# Patient Record
Sex: MALE | Race: WHITE | NOT HISPANIC OR LATINO | Employment: FULL TIME | URBAN - METROPOLITAN AREA
[De-identification: names, ages, dates, MRNs, and addresses within clinical notes are randomized per-mention and may not be internally consistent; named-entity substitution may affect disease eponyms.]

---

## 2017-11-01 NOTE — PATIENT INSTRUCTIONS
Refills of medications sent to [facility].    Lab work today.    I will call you later about the shingles vaccine.    Leslee Magaña, VITALY-C      Preventive Health Recommendations  Male Ages 50 - 64    Yearly exam:             See your health care provider every year in order to  o   Review health changes.   o   Discuss preventive care.    o   Review your medicines if your doctor has prescribed any.     Have a cholesterol test every 5 years, or more frequently if you are at risk for high cholesterol/heart disease.     Have a diabetes test (fasting glucose) every three years. If you are at risk for diabetes, you should have this test more often.     Have a colonoscopy at age 50, or have a yearly FIT test (stool test). These exams will check for colon cancer.      Talk with your health care provider about whether or not a prostate cancer screening test (PSA) is right for you.    You should be tested each year for STDs (sexually transmitted diseases), if you re at risk.     Shots: Get a flu shot each year. Get a tetanus shot every 10 years.     Nutrition:    Eat at least 5 servings of fruits and vegetables daily.     Eat whole-grain bread, whole-wheat pasta and brown rice instead of white grains and rice.     Talk to your provider about Calcium and Vitamin D.     Lifestyle    Exercise for at least 150 minutes a week (30 minutes a day, 5 days a week). This will help you control your weight and prevent disease.     Limit alcohol to one drink per day.     No smoking.     Wear sunscreen to prevent skin cancer.     See your dentist every six months for an exam and cleaning.     See your eye doctor every 1 to 2 years.

## 2017-11-01 NOTE — PROGRESS NOTES
SUBJECTIVE:   CC: Noris Small is an 52 year old male who presents for preventative health visit.     Pt. Need a refill on Atenolol and Hyoscyamine medication     Physical   Annual:     Getting at least 3 servings of Calcium per day::  NO    Bi-annual eye exam::  Yes    Dental care twice a year::  Yes    Sleep apnea or symptoms of sleep apnea::  Sleep apnea    Diet::  Regular (no restrictions) and Diabetic    Frequency of exercise::  None    Taking medications regularly::  Yes    Medication side effects::  None    Additional concerns today::  YES (he want to talk to you about vaccine singles and also he want you to look up his both ear he requested for ear lavage. he think he have a wax. )    No chicken pox  Colonoscopy - had yesterday - [place] [facility]    Polycystic kidney disease    Moderate alcohol      Patient just move here 2 month ago he is a new patient. All medical record is on [place].     Patient is just move here 2 months ago he is a new patient.         Today's PHQ-2 Score:   PHQ-2 ( 1999 Pf[employer]) 11/3/2017   Q1: Little interest or pleasure in doing things 0   Q2: Feeling down, depressed or hopeless 0   PHQ-2 Score 0   Q1: Little interest or pleasure in doing things Not at all   Q2: Feeling down, depressed or hopeless Not at all   PHQ-2 Score 0       Abuse: Current or Past(Physical, Sexual or Emotional)- No  Do you feel safe in your environment - Yes    Social History   Substance Use Topics     Smoking status: Never Smoker     Smokeless tobacco: Never Used     Alcohol use Yes      Comment: occ     The patient does not drink >3 drinks per day nor >7 drinks per week.    Last PSA:   PSA   Date Value Ref Range Status   11/03/2017 0.63 0 - 4 ug/L Final     Comment:     Assay Method:  Chemiluminescence using Siemens Vista analyzer       Both knees replaced - left 7, right 3  Right shoulder rotator cuff - 3 years       Reviewed orders with patient. Reviewed health maintenance and updated orders  "accordingly - Yes  Labs reviewed in EPIC  BP Readings from Last 3 Encounters:   11/03/17 144/87   11/03/17 124/80   07/25/08 130/75    Wt Readings from Last 3 Encounters:   11/03/17 (!) 313 lb (142 kg)   11/03/17 (!) 313 lb 1.6 oz (142 kg)   07/25/08 210 lb (95.3 kg)                      Reviewed and updated as needed this visit by clinical staff  Tobacco  Allergies  Meds  Med Hx  Surg Hx  Fam Hx  Soc Hx        Reviewed and updated as needed this visit by Provider        History reviewed. No pertinent past medical history.   History reviewed. No pertinent surgical history.  Review of Systems  C: NEGATIVE for fever, chills, change in weight  I: NEGATIVE for worrisome rashes, moles or lesions  E: NEGATIVE for vision changes or irritation  ENT: NEGATIVE for ear, mouth and throat problems  R: NEGATIVE for significant cough or SOB  CV: NEGATIVE for chest pain, palpitations or peripheral edema  GI: NEGATIVE for nausea, abdominal pain, heartburn, or change in bowel habits   male: negative for dysuria, hematuria, decreased urinary stream, erectile dysfunction, urethral discharge  M: NEGATIVE for significant arthralgias or myalgia  N: NEGATIVE for weakness, dizziness or paresthesias  P: NEGATIVE for changes in mood or affect    OBJECTIVE:   /80  Pulse 80  Temp 97.7  F (36.5  C) (Temporal)  Resp 16  Ht 5' 9.88\" (1.775 m)  Wt (!) 313 lb 1.6 oz (142 kg)  BMI 45.08 kg/m2    Physical Exam  GENERAL: healthy, alert and no distress  EYES: Eyes grossly normal to inspection, PERRL and conjunctivae and sclerae normal  HENT: ear canals and TM's normal, nose and mouth without ulcers or lesions  NECK: no adenopathy, no asymmetry, masses, or scars and thyroid normal to palpation  RESP: lungs clear to auscultation - no rales, rhonchi or wheezes  CV: regular rate and rhythm, normal S1 S2, no S3 or S4, no murmur, click or rub, no peripheral edema and peripheral pulses strong  ABDOMEN: soft, nontender, no " hepatosplenomegaly, no masses and bowel sounds normal  MS: no gross musculoskeletal defects noted, no edema  SKIN: no suspicious lesions or rashes  NEURO: Normal strength and tone, mentation intact and speech normal  PSYCH: mentation appears normal, affect normal/bright    LABS:  Results for orders placed or performed in visit on 11/03/17   Varicella Zoster Virus Antibody IgG   Result Value Ref Range    Varicella Zoster Virus Antibody IgG 0.8 0.0 - 0.8 AI   PSA, screen   Result Value Ref Range    PSA 0.63 0 - 4 ug/L   TSH with free T4 reflex   Result Value Ref Range    TSH 1.18 0.40 - 4.00 mU/L   Lipid panel reflex to direct LDL Non-fasting   Result Value Ref Range    Cholesterol 197 <200 mg/dL    Triglycerides 140 <150 mg/dL    HDL Cholesterol 38 (L) >39 mg/dL    LDL Cholesterol Calculated 131 (H) <100 mg/dL    Non HDL Cholesterol 159 (H) <130 mg/dL       ASSESSMENT/PLAN:   1. Encounter for routine adult health examination without abnormal findings  Doing well overall    2. Mixed hyperlipidemia  The 10-year ASCVD risk score (Johana JERICA Jr, et al., 2013) is: 6.5%    Values used to calculate the score:      Age: 52 years      Sex: Male      Is Non- : No      Diabetic: No      Tobacco smoker: No      Systolic Blood Pressure: 144 mmHg      Is BP treated: Yes      HDL Cholesterol: 38 mg/dL      Total Cholesterol: 197 mg/dL    Consider statin. Will call and discuss with patient.     3. Benign essential hypertension  Stable  - atenolol (TENORMIN) 50 MG tablet; Take 1 tablet (50 mg) by mouth daily  Dispense: 90 tablet; Refill: 3    4. Hyperhidrosis, focal, secondary  Stable. Uses hyoscyamine for speeches and presentations to help with hyperhidrosis.  - atenolol (TENORMIN) 50 MG tablet; Take 1 tablet (50 mg) by mouth daily  Dispense: 90 tablet; Refill: 3  - hyoscyamine (LEVBID) 0.375 MG 12 hr tablet; Take 1 tablet (0.375 mg) by mouth every 12 hours as needed for cramping  Dispense: 30 tablet; Refill:  "3    5. Encounter for screening for cardiovascular disorders  - Lipid panel reflex to direct LDL Non-fasting    6. Encounter for special needs assessment  Patient request as he does not think he had chicken pox as a child. Discussed the importance of shingles vaccine to prevent severe infection should he contract varicella zoster as an adult.  - Varicella Zoster Virus Antibody IgG    7. Family history of thyroid disease in father  - TSH with free T4 reflex    8. Screening for prostate cancer  - PSA, screen    9. Screening for diabetes mellitus  - Basic metabolic panel; Future    10. Family history of polycystic kidney disease  - Basic metabolic panel; Future    11. Need for prophylactic vaccination and inoculation against influenza  - Vaccine Administration, Initial [95160]  - FLU VAC, SPLIT VIRUS IM > 3 YO (QUADRIVALENT) [07279]    COUNSELING:   Reviewed preventive health counseling, as reflected in patient instructions       Regular exercise       Healthy diet/nutrition       Vision screening       Aspirin Prophylaxsis       Consider Hep C screening for patients born between 1945 and 1965       Colon cancer screening       Prostate cancer screening             reports that he has never smoked. He has never used smokeless tobacco.      Estimated body mass index is 45.08 kg/(m^2) as calculated from the following:    Height as of this encounter: 5' 9.88\" (1.775 m).    Weight as of this encounter: 313 lb 1.6 oz (142 kg).   Weight management plan: Discussed healthy diet and exercise guidelines and patient will follow up in 12 months in clinic to re-evaluate.    Counseling Resources:  ATP IV Guidelines  Pooled Cohorts Equation Calculator  FRAX Risk Assessment  ICSI Preventive Guidelines  Dietary Guidelines for Americans, 2010  USDA's MyPlate  ASA Prophylaxis  Lung CA Screening    SIHRLEY Casey [facility]  "

## 2017-11-02 ENCOUNTER — TRANSFERRED RECORDS (OUTPATIENT)
Dept: HEALTH INFORMATION MANAGEMENT | Facility: CLINIC | Age: 52
End: 2017-11-02

## 2017-11-03 ENCOUNTER — OFFICE VISIT (OUTPATIENT)
Dept: FAMILY MEDICINE | Facility: OTHER | Age: 52
End: 2017-11-03
Payer: COMMERCIAL

## 2017-11-03 ENCOUNTER — OFFICE VISIT (OUTPATIENT)
Dept: SLEEP MEDICINE | Facility: CLINIC | Age: 52
End: 2017-11-03
Payer: COMMERCIAL

## 2017-11-03 VITALS
RESPIRATION RATE: 16 BRPM | SYSTOLIC BLOOD PRESSURE: 124 MMHG | TEMPERATURE: 97.7 F | BODY MASS INDEX: 44.82 KG/M2 | WEIGHT: 313.1 LBS | HEART RATE: 80 BPM | HEIGHT: 70 IN | DIASTOLIC BLOOD PRESSURE: 80 MMHG

## 2017-11-03 VITALS
RESPIRATION RATE: 16 BRPM | HEIGHT: 69 IN | BODY MASS INDEX: 46.36 KG/M2 | WEIGHT: 313 LBS | HEART RATE: 70 BPM | DIASTOLIC BLOOD PRESSURE: 87 MMHG | OXYGEN SATURATION: 95 % | SYSTOLIC BLOOD PRESSURE: 144 MMHG

## 2017-11-03 DIAGNOSIS — Z00.00 ENCOUNTER FOR ROUTINE ADULT HEALTH EXAMINATION WITHOUT ABNORMAL FINDINGS: Primary | ICD-10-CM

## 2017-11-03 DIAGNOSIS — L74.52: ICD-10-CM

## 2017-11-03 DIAGNOSIS — Z13.1 SCREENING FOR DIABETES MELLITUS: ICD-10-CM

## 2017-11-03 DIAGNOSIS — Z12.5 SCREENING FOR PROSTATE CANCER: ICD-10-CM

## 2017-11-03 DIAGNOSIS — Z01.89 ENCOUNTER FOR SPECIAL NEEDS ASSESSMENT: ICD-10-CM

## 2017-11-03 DIAGNOSIS — Z83.49 FAMILY HISTORY OF THYROID DISEASE IN FATHER: ICD-10-CM

## 2017-11-03 DIAGNOSIS — E78.2 MIXED HYPERLIPIDEMIA: ICD-10-CM

## 2017-11-03 DIAGNOSIS — G47.33 OSA (OBSTRUCTIVE SLEEP APNEA): Primary | ICD-10-CM

## 2017-11-03 DIAGNOSIS — I10 BENIGN ESSENTIAL HYPERTENSION: ICD-10-CM

## 2017-11-03 DIAGNOSIS — Z23 NEED FOR PROPHYLACTIC VACCINATION AND INOCULATION AGAINST INFLUENZA: ICD-10-CM

## 2017-11-03 DIAGNOSIS — Z82.71 FAMILY HISTORY OF POLYCYSTIC KIDNEY DISEASE: ICD-10-CM

## 2017-11-03 DIAGNOSIS — Z13.6 ENCOUNTER FOR SCREENING FOR CARDIOVASCULAR DISORDERS: ICD-10-CM

## 2017-11-03 LAB
CHOLEST SERPL-MCNC: 197 MG/DL
HDLC SERPL-MCNC: 38 MG/DL
LDLC SERPL CALC-MCNC: 131 MG/DL
NONHDLC SERPL-MCNC: 159 MG/DL
PSA SERPL-ACNC: 0.63 UG/L (ref 0–4)
TRIGL SERPL-MCNC: 140 MG/DL
TSH SERPL DL<=0.005 MIU/L-ACNC: 1.18 MU/L (ref 0.4–4)

## 2017-11-03 PROCEDURE — 90686 IIV4 VACC NO PRSV 0.5 ML IM: CPT | Performed by: STUDENT IN AN ORGANIZED HEALTH CARE EDUCATION/TRAINING PROGRAM

## 2017-11-03 PROCEDURE — 36415 COLL VENOUS BLD VENIPUNCTURE: CPT | Performed by: STUDENT IN AN ORGANIZED HEALTH CARE EDUCATION/TRAINING PROGRAM

## 2017-11-03 PROCEDURE — 99213 OFFICE O/P EST LOW 20 MIN: CPT | Performed by: PHYSICIAN ASSISTANT

## 2017-11-03 PROCEDURE — 99396 PREV VISIT EST AGE 40-64: CPT | Mod: 25 | Performed by: STUDENT IN AN ORGANIZED HEALTH CARE EDUCATION/TRAINING PROGRAM

## 2017-11-03 PROCEDURE — 86787 VARICELLA-ZOSTER ANTIBODY: CPT | Performed by: STUDENT IN AN ORGANIZED HEALTH CARE EDUCATION/TRAINING PROGRAM

## 2017-11-03 PROCEDURE — 84443 ASSAY THYROID STIM HORMONE: CPT | Performed by: STUDENT IN AN ORGANIZED HEALTH CARE EDUCATION/TRAINING PROGRAM

## 2017-11-03 PROCEDURE — 90471 IMMUNIZATION ADMIN: CPT | Performed by: STUDENT IN AN ORGANIZED HEALTH CARE EDUCATION/TRAINING PROGRAM

## 2017-11-03 PROCEDURE — G0103 PSA SCREENING: HCPCS | Performed by: STUDENT IN AN ORGANIZED HEALTH CARE EDUCATION/TRAINING PROGRAM

## 2017-11-03 PROCEDURE — 80061 LIPID PANEL: CPT | Performed by: STUDENT IN AN ORGANIZED HEALTH CARE EDUCATION/TRAINING PROGRAM

## 2017-11-03 RX ORDER — HYOSCYAMINE SULFATE 0.38 MG/1
0.38 TABLET, EXTENDED RELEASE ORAL EVERY 12 HOURS PRN
Qty: 30 TABLET | Refills: 3 | Status: SHIPPED | OUTPATIENT
Start: 2017-11-03 | End: 2018-03-16

## 2017-11-03 RX ORDER — HYOSCYAMINE SULFATE 0.38 MG/1
0.38 TABLET, EXTENDED RELEASE ORAL EVERY 12 HOURS PRN
COMMUNITY
End: 2017-11-03

## 2017-11-03 RX ORDER — ATENOLOL 50 MG/1
50 TABLET ORAL DAILY
COMMUNITY
End: 2017-11-03

## 2017-11-03 RX ORDER — ATENOLOL 50 MG/1
50 TABLET ORAL DAILY
Qty: 90 TABLET | Refills: 3 | Status: SHIPPED | OUTPATIENT
Start: 2017-11-03 | End: 2019-11-01

## 2017-11-03 ASSESSMENT — PAIN SCALES - GENERAL: PAINLEVEL: NO PAIN (0)

## 2017-11-03 NOTE — MR AVS SNAPSHOT
After Visit Summary   11/3/2017    Noris Small    MRN: [id number]           Patient Information     Date Of Birth          1965        Visit Information        Provider Department      11/3/2017 9:40 AM Leslee Magaña APRN [facility]        Today's Diagnoses     Encounter for screening for cardiovascular disorders    -  1    Encounter for special needs assessment        Family history of thyroid disease in father        Screening for prostate cancer        Screening for diabetes mellitus        Family history of polycystic kidney disease        Benign essential hypertension        Hyperhidrosis, focal, secondary          Care Instructions    Refills of medications sent to [facility].    Lab work today.    I will call you later about the shingles vaccine.    Leslee Magaña, NPFaustinoC      Preventive Health Recommendations  Male Ages 50 - 64    Yearly exam:             See your health care provider every year in order to  o   Review health changes.   o   Discuss preventive care.    o   Review your medicines if your doctor has prescribed any.     Have a cholesterol test every 5 years, or more frequently if you are at risk for high cholesterol/heart disease.     Have a diabetes test (fasting glucose) every three years. If you are at risk for diabetes, you should have this test more often.     Have a colonoscopy at age 50, or have a yearly FIT test (stool test). These exams will check for colon cancer.      Talk with your health care provider about whether or not a prostate cancer screening test (PSA) is right for you.    You should be tested each year for STDs (sexually transmitted diseases), if you re at risk.     Shots: Get a flu shot each year. Get a tetanus shot every 10 years.     Nutrition:    Eat at least 5 servings of fruits and vegetables daily.     Eat whole-grain bread, whole-wheat pasta and brown rice instead of white grains and rice.     Talk to your provider  "about Calcium and Vitamin D.     Lifestyle    Exercise for at least 150 minutes a week (30 minutes a day, 5 days a week). This will help you control your weight and prevent disease.     Limit alcohol to one drink per day.     No smoking.     Wear sunscreen to prevent skin cancer.     See your dentist every six months for an exam and cleaning.     See your eye doctor every 1 to 2 years.            Follow-ups after your visit        Your next 10 appointments already scheduled     Nov 03, 2017  3:30 PM CDT   New Sleep Patient with Zeke Ellis PA-C   [facility] [place] [place]    [address] [place] [id number] [phone]              Future tests that were ordered for you today     Open Future Orders        Priority Expected Expires Ordered    Basic metabolic panel Routine  2/1/2018 11/3/2017            Who to contact     If you have questions or need follow up information about today's clinic visit or your schedule please contact [facility] directly at [phone].  Normal or non-critical lab and imaging results will be communicated to you by [employer], letter or phone within 4 business days after the clinic has received the results. If you do not hear from us within 7 days, please contact the clinic through [employer] technology[employer] or phone. If you have a critical or abnormal lab result, we will notify you by phone as soon as possible.  Submit refill requests through Dashbell or call your pharmacy and they will forward the refill request to us. Please allow 3 business days for your refill to be completed.          Additional Information About Your Visit        Dashbell Information     Dashbell lets you send messages to your doctor, view your test results, renew your prescriptions, schedule appointments and more. To sign up, go to [email or web address] . Click on \"Log in\" on the left side of the screen, which will take you to the Welcome page. Then " "click on \"Sign up Now\" on the right side of the page.     You will be asked to enter the access code listed below, as well as some personal information. Please follow the directions to create your username and password.     Your access code is: [email or web address]  Expires: 2018 10:33 AM     Your access code will  in 90 days. If you need help or a new code, please call your [facility] or [phone].        Care EveryWhere ID     This is your Care EveryWhere ID. This could be used by other organizations to access your [place] medical records  [id number]        Your Vitals Were     Pulse Temperature Respirations Height BMI (Body Mass Index)       80 97.7  F (36.5  C) (Temporal) 16 5' 9.88\" (1.775 m) 45.08 kg/m2        Blood Pressure from Last 3 Encounters:   17 124/80   08 130/75   08 118/62    Weight from Last 3 Encounters:   17 (!) 313 lb 1.6 oz (142 kg)   08 210 lb (95.3 kg)   08 200 lb (90.7 kg)              We Performed the Following     Lipid panel reflex to direct LDL Fasting     PSA, screen     TSH with free T4 reflex     Varicella Zoster Virus Antibody IgG          Where to get your medicines      These medications were sent to [facility] [place] - [address] [place] [address]  [address] [place] [address], [place] [address]     Phone:  [phone]     atenolol 50 MG tablet    hyoscyamine 0.375 MG 12 hr tablet          Primary Care Provider Office Phone # Fax #    [facility] [phone]       [address] [address]        Equal Access to Services     AUSTIN ROBLEDO : Jenny Borden, waliliana joe, qaybta kaalmaparvez souza, lizz clay. So [facility] [phone].    ATENCIÓN: Si hildala español, tiene a solorzano disposición servicios gratuitos de asistencia lingüística. Pako al [phone].    We comply with applicable federal civil " rights laws and [place] laws. We do not discriminate on the basis of race, color, national origin, age, disability, sex, sexual orientation, or gender identity.            Thank you!     Thank you for choosing [facility]  for your care. Our goal is always to provide you with excellent care. Hearing back from our patients is one way we can continue to improve our services. Please take a few minutes to complete the written survey that you may receive in the mail after your visit with us. Thank you!             Your Updated Medication List - Protect others around you: Learn how to safely use, store and throw away your medicines at www.disposemymeds.org.          This list is accurate as of: 11/3/17 10:33 AM.  Always use your most recent med list.                   Brand Name Dispense Instructions for use Diagnosis    ADVIL PO      1 CAPSULE EVERY 4 TO 6 HOURS AS NEEDED        ALEVE PO      1 TABLET EVERY 12 HOURS AS NEEDED        atenolol 50 MG tablet    TENORMIN    90 tablet    Take 1 tablet (50 mg) by mouth daily    Benign essential hypertension, Hyperhidrosis, focal, secondary       hyoscyamine 0.375 MG 12 hr tablet    LEVBID    30 tablet    Take 1 tablet (0.375 mg) by mouth every 12 hours as needed for cramping    Hyperhidrosis, focal, secondary       MOBIC 15 MG tablet   Generic drug:  meloxicam     30    1 TABLET DAILY

## 2017-11-03 NOTE — NURSING NOTE
Patient brought to lab non fasting for fasting cholesterol. Patient said he may not return for fasting labs, changed order and wilian patient.

## 2017-11-03 NOTE — PROGRESS NOTES
Injectable Influenza Immunization Documentation    1.  Is the person to be vaccinated sick today?   No    2. Does the person to be vaccinated have an allergy to a component   of the vaccine?   No  Egg Allergy Algorithm Link    3. Has the person to be vaccinated ever had a serious reaction   to influenza vaccine in the past?   No    4. Has the person to be vaccinated ever had Guillain-Barré syndrome?   No    Form completed by Cynthia Lopez CMA    Prior to injection verified patient identity using patient's name and date of birth.  Patient instructed to remain in clinic for 15 minutes afterwards, and to report any adverse reaction to me immediately.

## 2017-11-03 NOTE — PROGRESS NOTES
"  Sleep Consultation:    Date on this visit: 11/3/2017    Noris Small is sent by [facility] for a sleep consultation regarding mild LUIS ALFREDO (AHI 13). He is here to establish care, he is on auto pap 5-20. He has no concerns and the CPAP is working well for him. Download report shows usage of 6 hours 45 minutes, average pressure 90% 8.4, AHI is 2.2, ) large leak. .    Primary Physician: Leslee Magaña     Allergies:    Allergies   Allergen Reactions     Penicillins Hives       Medications:    Current Outpatient Prescriptions   Medication Sig Dispense Refill     atenolol (TENORMIN) 50 MG tablet Take 1 tablet (50 mg) by mouth daily 90 tablet 3     hyoscyamine (LEVBID) 0.375 MG 12 hr tablet Take 1 tablet (0.375 mg) by mouth every 12 hours as needed for cramping 30 tablet 3     MOBIC 15 MG OR TABS 1 TABLET DAILY 30 5     ALEVE OR 1 TABLET EVERY 12 HOURS AS NEEDED       ADVIL OR 1 CAPSULE EVERY 4 TO 6 HOURS AS NEEDED       [DISCONTINUED] atenolol (TENORMIN) 50 MG tablet Take 50 mg by mouth daily         Problem List:  There are no active problems to display for this patient.       Past Medical/Surgical History:  No past medical history on file.  No past surgical history on file.    Social History:  Social History     Social History     Marital status:      Spouse name: N/A     Number of children: N/A     Years of education: N/A     Occupational History     Not on file.     Social History Main Topics     Smoking status: Never Smoker     Smokeless tobacco: Never Used     Alcohol use Yes      Comment: occ     Drug use: No     Sexual activity: Yes     Partners: Female     Other Topics Concern     Not on file     Social History Narrative       Family History:  Family History   Problem Relation Age of Onset     Polycystic Kidney Diease Mother      DIABETES Father      Thyroid Disease Father      DIABETES Brother          Physical Examination:  Vitals: /87  Pulse 70  Resp 16  Ht 1.753 m (5' 9\")  Wt " (!) 142 kg (313 lb)  SpO2 95%  BMI 46.22 kg/m2  BMI= Body mass index is 46.22 kg/(m^2).         No flowsheet data found.          Impression/Plan:    Mild sleep apnea, well treated on CPAP. We will tighten up his pressure range 7-12 CMH2O, otherwise he will call if he needs a new prescription for supplies.     [employer]ban    CC: [facility]

## 2017-11-03 NOTE — PATIENT INSTRUCTIONS

## 2017-11-03 NOTE — NURSING NOTE
"Chief Complaint   Patient presents with     Physical     Panel Management     vivek, mychart, flu shot, colon cancer screen, lipids       Initial /80  Pulse 80  Temp 97.7  F (36.5  C) (Temporal)  Resp 16  Ht 5' 9.88\" (1.775 m)  Wt (!) 313 lb 1.6 oz (142 kg)  BMI 45.08 kg/m2 Estimated body mass index is 45.08 kg/(m^2) as calculated from the following:    Height as of this encounter: 5' 9.88\" (1.775 m).    Weight as of this encounter: 313 lb 1.6 oz (142 kg).  Medication Reconciliation: complete   Dane Kumar      "

## 2017-11-03 NOTE — MR AVS SNAPSHOT
After Visit Summary   11/3/2017    Noris Small    MRN: [id number]           Patient Information     Date Of Birth          1965        Visit Information        Provider Department      11/3/2017 3:30 PM Zeke Ellis PA-C [place] [facility]        Today's Diagnoses     LUIS ALFREDO (obstructive sleep apnea)    -  1      Care Instructions      Your BMI is Body mass index is 46.22 kg/(m^2).  Weight management is a personal decision.  If you are interested in exploring weight loss strategies, the following discussion covers the approaches that may be successful. Body mass index (BMI) is one way to tell whether you are at a healthy weight, overweight, or obese. It measures your weight in relation to your height.  A BMI of 18.5 to 24.9 is in the healthy range. A person with a BMI of 25 to 29.9 is considered overweight, and someone with a BMI of 30 or greater is considered obese. More than two-thirds of American adults are considered overweight or obese.  Being overweight or obese increases the risk for further weight gain. Excess weight may lead to heart disease and diabetes.  Creating and following plans for healthy eating and physical activity may help you improve your health.  Weight control is part of healthy lifestyle and includes exercise, emotional health, and healthy eating habits. Careful eating habits lifelong are the mainstay of weight control. Though there are significant health benefits from weight loss, long-term weight loss with diet alone may be very difficult to achieve- studies show long-term success with dietary management in less than 10% of people. Attaining a healthy weight may be especially difficult to achieve in those with severe obesity. In some cases, medications, devices and surgical management might be considered.  What can you do?  If you are overweight or obese and are interested in methods for weight loss, you should discuss this with your provider.      Consider reducing daily calorie intake by 500 calories.     Keep a food journal.     Avoiding skipping meals, consider cutting portions instead.    Diet combined with exercise helps maintain muscle while optimizing fat loss. Strength training is particularly important for building and maintaining muscle mass. Exercise helps reduce stress, increase energy, and improves fitness. Increasing exercise without diet control, however, may not burn enough calories to loose weight.       Start walking three days a week 10-20 minutes at a time    Work towards walking thirty minutes five days a week     Eventually, increase the speed of your walking for 1-2 minutes at time    In addition, we recommend that you review healthy lifestyles and methods for weight loss available through the National Institutes of Health patient information sites:  http://win.niddk.nih.gov/publications/index.htm    And look into health and wellness programs that may be available through your health insurance provider, employer, local community center, or warren club.    Weight management plan: Patient was referred to their PCP to discuss a diet and exercise plan.              Follow-ups after your visit        Follow-up notes from your care team     Return in about 1 year (around 11/3/2018).      Future tests that were ordered for you today     Open Future Orders        Priority Expected Expires Ordered    Basic metabolic panel Routine  2/1/2018 11/3/2017            Who to contact     If you have questions or need follow up information about today's clinic visit or your schedule please contact [facility] directly at [phone].  Normal or non-critical lab and imaging results will be communicated to you by MyChart, letter or phone within 4 business days after the clinic has received the results. If you do not hear from us within 7 days, please contact the clinic through MyChart or phone. If you have a critical or abnormal lab  "result, we will notify you by phone as soon as possible.  Submit refill requests through SQLstream or call your pharmacy and they will forward the refill request to us. Please allow 3 business days for your refill to be completed.          Additional Information About Your Visit        Xigenhart Information     SQLstream lets you send messages to your doctor, view your test results, renew your prescriptions, schedule appointments and more. To sign up, go to [email or web address] . Click on \"Log in\" on the left side of the screen, which will take you to the Welcome page. Then click on \"Sign up Now\" on the right side of the page.     You will be asked to enter the access code listed below, as well as some personal information. Please follow the directions to create your username and password.     Your access code is: [email or web address]  Expires: 2018 10:33 AM     Your access code will  in 90 days. If you need help or a new code, please call your [place] clinic or [phone].        Care EveryWhere ID     This is your Care EveryWhere ID. This could be used by other organizations to access your [place] medical records  [id number]        Your Vitals Were     Pulse Respirations Height Pulse Oximetry BMI (Body Mass Index)       70 16 1.753 m (5' 9\") 95% 46.22 kg/m2        Blood Pressure from Last 3 Encounters:   17 144/87   17 124/80   08 130/75    Weight from Last 3 Encounters:   17 (!) 142 kg (313 lb)   17 (!) 142 kg (313 lb 1.6 oz)   08 95.3 kg (210 lb)              We Performed the Following     Comprehensive DME          Where to get your medicines      These medications were sent to [facility] [place] - [address]  [address] [place] [address], [place] [address]     Phone:  [phone]     atenolol 50 MG tablet    hyoscyamine 0.375 MG 12 hr tablet          Primary Care Provider Office Phone # Fax #    Leslee " Michelle Magaña, APRN [phone]       [address] [place] [address] [address]  [facility] [address]        Equal Access to Services     AUSTIN ROBLEDO : Hadii aad ku hadshaanyohana Soivan, wawilverda luqadaha, qaybta kaalmada adeivana, lizz hoodin hayaaeder crowleyalex stinson aaron clay. So [facility] [phone].    ATENCIÓN: Si habla español, tiene a solorzano disposición servicios gratuitos de asistencia lingüística. Llame al [phone].    We comply with applicable federal civil rights laws and [place] laws. We do not discriminate on the basis of race, color, national origin, age, disability, sex, sexual orientation, or gender identity.            Thank you!     Thank you for choosing [place] [facility]  for your care. Our goal is always to provide you with excellent care. Hearing back from our patients is one way we can continue to improve our services. Please take a few minutes to complete the written survey that you may receive in the mail after your visit with us. Thank you!             Your Updated Medication List - Protect others around you: Learn how to safely use, store and throw away your medicines at www.disposemymeds.org.          This list is accurate as of: 11/3/17  4:31 PM.  Always use your most recent med list.                   Brand Name Dispense Instructions for use Diagnosis    ADVIL PO      1 CAPSULE EVERY 4 TO 6 HOURS AS NEEDED        ALEVE PO      1 TABLET EVERY 12 HOURS AS NEEDED        atenolol 50 MG tablet    TENORMIN    90 tablet    Take 1 tablet (50 mg) by mouth daily    Benign essential hypertension, Hyperhidrosis, focal, secondary       hyoscyamine 0.375 MG 12 hr tablet    LEVBID    30 tablet    Take 1 tablet (0.375 mg) by mouth every 12 hours as needed for cramping    Hyperhidrosis, focal, secondary       MOBIC 15 MG tablet   Generic drug:  meloxicam     30    1 TABLET DAILY

## 2017-11-03 NOTE — NURSING NOTE
"Chief Complaint   Patient presents with     Sleep Problem     establish care       Initial /87  Pulse 70  Resp 16  Ht 1.753 m (5' 9\")  Wt (!) 142 kg (313 lb)  SpO2 95%  BMI 46.22 kg/m2 Estimated body mass index is 46.22 kg/(m^2) as calculated from the following:    Height as of this encounter: 1.753 m (5' 9\").    Weight as of this encounter: 142 kg (313 lb).  Medication Reconciliation: complete   Neck circumference: 17.34 inches.        "

## 2017-11-06 LAB — VZV IGG SER QL IA: 0.8 AI (ref 0–0.8)

## 2017-11-12 PROBLEM — E78.2 MIXED HYPERLIPIDEMIA: Status: ACTIVE | Noted: 2017-11-12

## 2017-11-12 PROBLEM — L74.52: Status: ACTIVE | Noted: 2017-11-12

## 2017-11-12 RX ORDER — SIMVASTATIN 20 MG
20 TABLET ORAL AT BEDTIME
Qty: 90 TABLET | Refills: 3 | Status: CANCELLED | OUTPATIENT
Start: 2017-11-12

## 2017-12-31 ENCOUNTER — TRANSFERRED RECORDS (OUTPATIENT)
Dept: MULTI SPECIALTY CLINIC | Facility: CLINIC | Age: 52
End: 2017-12-31

## 2018-02-09 ENCOUNTER — TELEPHONE (OUTPATIENT)
Dept: FAMILY MEDICINE | Facility: OTHER | Age: 53
End: 2018-02-09

## 2018-02-09 ENCOUNTER — ALLIED HEALTH/NURSE VISIT (OUTPATIENT)
Dept: FAMILY MEDICINE | Facility: OTHER | Age: 53
End: 2018-02-09
Payer: COMMERCIAL

## 2018-02-09 VITALS — HEART RATE: 76 BPM | SYSTOLIC BLOOD PRESSURE: 128 MMHG | DIASTOLIC BLOOD PRESSURE: 70 MMHG

## 2018-02-09 DIAGNOSIS — I10 BENIGN ESSENTIAL HYPERTENSION: Primary | ICD-10-CM

## 2018-02-09 DIAGNOSIS — Z82.71 FAMILY HISTORY OF POLYCYSTIC KIDNEY DISEASE: ICD-10-CM

## 2018-02-09 DIAGNOSIS — Z13.1 SCREENING FOR DIABETES MELLITUS: ICD-10-CM

## 2018-02-09 LAB
ANION GAP SERPL CALCULATED.3IONS-SCNC: 8 MMOL/L (ref 3–14)
BUN SERPL-MCNC: 16 MG/DL (ref 7–30)
CALCIUM SERPL-MCNC: 8.9 MG/DL (ref 8.5–10.1)
CHLORIDE SERPL-SCNC: 107 MMOL/L (ref 94–109)
CO2 SERPL-SCNC: 24 MMOL/L (ref 20–32)
CREAT SERPL-MCNC: 0.86 MG/DL (ref 0.66–1.25)
GFR SERPL CREATININE-BSD FRML MDRD: >90 ML/MIN/1.7M2
GLUCOSE SERPL-MCNC: 89 MG/DL (ref 70–99)
POTASSIUM SERPL-SCNC: 4.5 MMOL/L (ref 3.4–5.3)
SODIUM SERPL-SCNC: 139 MMOL/L (ref 133–144)

## 2018-02-09 PROCEDURE — 80048 BASIC METABOLIC PNL TOTAL CA: CPT | Performed by: STUDENT IN AN ORGANIZED HEALTH CARE EDUCATION/TRAINING PROGRAM

## 2018-02-09 PROCEDURE — 36415 COLL VENOUS BLD VENIPUNCTURE: CPT | Performed by: STUDENT IN AN ORGANIZED HEALTH CARE EDUCATION/TRAINING PROGRAM

## 2018-02-09 PROCEDURE — 99207 ZZC NO CHARGE NURSE ONLY: CPT

## 2018-02-09 NOTE — PROGRESS NOTES
Chief Complaint   Patient presents with     Hypertension     Noris Small is a 52 year old male who comes in today for a Blood Pressure check because of ongoing blood pressure monitoring.    *Document pulse and BP  *Use new set of vitals button for multiple readings.  *Use extended vitals for orthostatic    Vitals as recorded, a large cuff was used.    Patient is taking medication as prescribed  Patient is tolerating medications well.  Patient is not monitoring Blood Pressure at home.  Average readings if yes are     Current complaints: none    Disposition: follow-up as indicated by MD/JOSE Dumas CMA (AAMA)

## 2018-02-09 NOTE — MR AVS SNAPSHOT
After Visit Summary   2/9/2018    Noris Small    MRN: [id number]           Patient Information     Date Of Birth          1965        Visit Information        Provider Department      2/9/2018 1:30 PM JULEE HOPSON NURSE [facility]        Today's Diagnoses     Benign essential hypertension    -  1       Follow-ups after your visit        Who to contact     If you have questions or need follow up information about today's clinic visit or your schedule please contact [facility] directly at [phone].  Normal or non-critical lab and imaging results will be communicated to you by Enova Systemshart, letter or phone within 4 business days after the clinic has received the results. If you do not hear from us within 7 days, please contact the clinic through Zopimt or phone. If you have a critical or abnormal lab result, we will notify you by phone as soon as possible.  Submit refill requests through Rep or call your pharmacy and they will forward the refill request to us. Please allow 3 business days for your refill to be completed.          Additional Information About Your Visit        MyChart Information     Rep gives you secure access to your electronic health record. If you see a primary care provider, you can also send messages to your care team and make appointments. If you have questions, please call your primary care clinic.  If you do not have a primary care provider, please call [phone] and they will assist you.        Care EveryWhere ID     This is your Care EveryWhere ID. This could be used by other organizations to access your [place] medical records  [id number]        Your Vitals Were     Pulse                   76            Blood Pressure from Last 3 Encounters:   02/09/18 128/70   11/03/17 144/87   11/03/17 124/80    Weight from Last 3 Encounters:   11/03/17 (!) 313 lb (142 kg)   11/03/17 (!) 313 lb 1.6 oz (142 kg)   07/25/08 210 lb (95.3 kg)               Today, you had the following     No orders found for display       Primary Care Provider Office Phone # Fax #    SHIRLEY [facility] [phone] [address] [address] [place] [address]        Equal Access to Services     AUSTIN ROBLEDO : Hadii aad ku hadasho Soomaali, waaxda luqadaha, qaybta kaalmada adeegyada, waxay idiin hayaan adeeg khradhash lamaya . So [facility] [phone].    ATENCIÓN: Si habla español, tiene a solorzano disposición servicios gratuitos de asistencia lingüística. Llame al [phone].    We comply with applicable federal civil rights laws and [place] laws. We do not discriminate on the basis of race, color, national origin, age, disability, sex, sexual orientation, or gender identity.            Thank you!     Thank you for choosing [facility]  for your care. Our goal is always to provide you with excellent care. Hearing back from our patients is one way we can continue to improve our services. Please take a few minutes to complete the written survey that you may receive in the mail after your visit with us. Thank you!             Your Updated Medication List - Protect others around you: Learn how to safely use, store and throw away your medicines at www.disposemymeds.org.          This list is accurate as of 2/9/18  1:52 PM.  Always use your most recent med list.                   Brand Name Dispense Instructions for use Diagnosis    ADVIL PO      1 CAPSULE EVERY 4 TO 6 HOURS AS NEEDED        ALEVE PO      1 TABLET EVERY 12 HOURS AS NEEDED        atenolol 50 MG tablet    TENORMIN    90 tablet    Take 1 tablet (50 mg) by mouth daily    Benign essential hypertension, Hyperhidrosis, focal, secondary       hyoscyamine 0.375 MG 12 hr tablet    LEVBID    30 tablet    Take 1 tablet (0.375 mg) by mouth every 12 hours as needed for cramping    Hyperhidrosis, focal, secondary       MOBIC 15 MG tablet   Generic drug:  meloxicam     30    1 TABLET DAILY

## 2018-02-09 NOTE — TELEPHONE ENCOUNTER
Panel Management Review      Patient has the following on his problem list:     Hypertension   Last three blood pressure readings:  BP Readings from Last 3 Encounters:   11/03/17 144/87   11/03/17 124/80   07/25/08 130/75     Blood pressure: MONITOR    HTN Guidelines:  Age 18-59 BP range:  Less than 140/90  Age 60-85 with Diabetes:  Less than 140/90  Age 60-85 without Diabetes:  less than 150/90      Composite cancer screening  Chart review shows that this patient is due/due soon for the following None  Summary:    Patient is due/failing the following:   bmp and BP CHECK    Action needed:   Patient needs non-fasting lab only appointment and Patient needs nurse only appointment.     Type of outreach:    Phone, spoke to patient.  Pt scheduled.    Questions for provider review:    None                                                                                                                                    Ludmila Dumas CMA ([facility])       Chart routed to Care Team .

## 2018-03-16 DIAGNOSIS — L74.52: ICD-10-CM

## 2018-03-16 RX ORDER — HYOSCYAMINE SULFATE 0.38 MG/1
TABLET, EXTENDED RELEASE ORAL
Qty: 30 TABLET | Refills: 3 | Status: SHIPPED | OUTPATIENT
Start: 2018-03-16 | End: 2019-11-08

## 2018-03-16 NOTE — TELEPHONE ENCOUNTER
Hyoscyamine    Prescription approved per [facility] Refill Protocol.    Maureen Rodríguez, RN, BSN

## 2018-04-02 ENCOUNTER — TRANSFERRED RECORDS (OUTPATIENT)
Dept: HEALTH INFORMATION MANAGEMENT | Facility: CLINIC | Age: 53
End: 2018-04-02

## 2018-05-17 NOTE — PROGRESS NOTES
SUBJECTIVE:   Noris Small is a 53 year old male who presents to clinic today for the following health issues:  Patient quit taking atenolol 6 weeks ago  The 10-year ASCVD risk score (Johana PIZANO Jr, et al., 2013) is: 5.8%    Values used to calculate the score:      Age: 53 years      Sex: Male      Is Non- : No      Diabetic: No      Tobacco smoker: No      Systolic Blood Pressure: 128 mmHg      Is BP treated: Yes      HDL Cholesterol: 38 mg/dL      Total Cholesterol: 197 mg/dL  Patient is eligible for use of low-dose aspirin for primary prevention of heart attack and stroke.  Provider has discussed aspirin with patient and our decision was:     Prescribe:  Daily low-dose aspirin recommended for primary prevention, patient agrees with plan.        HPI  Concern - Infection   Onset: 5 days    Description:   Right hand middle finger    Intensity: moderate    Progression of Symptoms:  worsening    Accompanying Signs & Symptoms:  Redness, Green, swelling, discomfort    Previous history of similar problem:   Yes    Precipitating factors:   Worsened by: none    Alleviating factors:  Improved by: none    Therapies Tried and outcome: none   Problem list and histories reviewed & adjusted, as indicated.  Additional history: as documented    Patient Active Problem List   Diagnosis     Hyperhidrosis, focal, secondary     Hyperlipidemia LDL goal <130     Morbid obesity (H)     History reviewed. No pertinent surgical history.    Social History   Substance Use Topics     Smoking status: Never Smoker     Smokeless tobacco: Never Used     Alcohol use Yes      Comment: occ     Family History   Problem Relation Age of Onset     Polycystic Kidney Diease Mother      DIABETES Father      Thyroid Disease Father      DIABETES Brother          Current Outpatient Prescriptions   Medication Sig Dispense Refill     ADVIL OR 1 CAPSULE EVERY 4 TO 6 HOURS AS NEEDED       ALEVE OR 1 TABLET EVERY 12 HOURS AS NEEDED        "clindamycin (CLEOCIN) 300 MG capsule Take 1 capsule (300 mg) by mouth 3 times daily 30 capsule 0     hyoscyamine (LEVBID) 0.375 MG 12 hr tablet TAKE ONE TABLET BY MOUTH EVERY 12 HOURS AS NEEDED FOR CRAMPING 30 tablet 3     MOBIC 15 MG OR TABS 1 TABLET DAILY 30 5     atenolol (TENORMIN) 50 MG tablet Take 1 tablet (50 mg) by mouth daily (Patient not taking: Reported on 5/18/2018) 90 tablet 3     Allergies   Allergen Reactions     Penicillins Hives     Recent Labs   Lab Test  02/09/18   1330  11/03/17   1047   LDL   --   131*   HDL   --   38*   TRIG   --   140   CR  0.86   --    GFRESTIMATED  >90   --    GFRESTBLACK  >90   --    POTASSIUM  4.5   --    TSH   --   1.18      BP Readings from Last 3 Encounters:   05/18/18 122/72   02/09/18 128/70   11/03/17 144/87    Wt Readings from Last 3 Encounters:   05/18/18 285 lb 11.2 oz (129.6 kg)   11/03/17 (!) 313 lb (142 kg)   11/03/17 (!) 313 lb 1.6 oz (142 kg)                  Labs reviewed in EPIC    ROS:  Constitutional, HEENT, cardiovascular, pulmonary, gi and gu systems are negative, except as otherwise noted.    OBJECTIVE:     /72 (Cuff Size: Adult Large)  Pulse 60  Temp 97.8  F (36.6  C) (Temporal)  Resp 16  Ht 5' 9\" (1.753 m)  Wt 285 lb 11.2 oz (129.6 kg)  BMI 42.19 kg/m2  Body mass index is 42.19 kg/(m^2).  GENERAL: healthy, alert and no distress  MS: no gross musculoskeletal defects noted, no edema  SKIN: no suspicious lesions or rashes with exception to the medial aspect of the right fourth finger along the cuticular edge.  There appears to be an infection in this region with possibly a small abscess.  Minimal erythema is noted proximal to this.  Consistent with infection/finger felon.  NEURO: Normal strength and tone, mentation intact and speech normal  PSYCH: mentation appears normal, affect normal/bright    Diagnostic Test Results:  No results found for this or any previous visit (from the past 24 hour(s)).    ASSESSMENT/PLAN:     1. Felon of finger of " right hand  Advise use of medication and soaking his hand in a regular basis.  Follow-up in 2 weeks if not improved.  - clindamycin (CLEOCIN) 300 MG capsule; Take 1 capsule (300 mg) by mouth 3 times daily  Dispense: 30 capsule; Refill: 0    2. Hypertension, benign essential, goal below 140/90  Resolved today.  This is a result of successful weight loss.  He is to continue to lose weight and physical exercise is strongly encouraged.    3. Morbid obesity (H)  He has had some success in decreasing his weight he continues to work on this consistently.  He is to follow-up in 6 months.    Jensen Conteh PA-C  [facility]

## 2018-05-18 ENCOUNTER — OFFICE VISIT (OUTPATIENT)
Dept: FAMILY MEDICINE | Facility: OTHER | Age: 53
End: 2018-05-18
Payer: COMMERCIAL

## 2018-05-18 VITALS
TEMPERATURE: 97.8 F | WEIGHT: 285.7 LBS | DIASTOLIC BLOOD PRESSURE: 72 MMHG | HEIGHT: 69 IN | SYSTOLIC BLOOD PRESSURE: 122 MMHG | BODY MASS INDEX: 42.32 KG/M2 | HEART RATE: 60 BPM | RESPIRATION RATE: 16 BRPM

## 2018-05-18 DIAGNOSIS — I10 HYPERTENSION, BENIGN ESSENTIAL, GOAL BELOW 140/90: ICD-10-CM

## 2018-05-18 DIAGNOSIS — L03.011 FELON OF FINGER OF RIGHT HAND: Primary | ICD-10-CM

## 2018-05-18 DIAGNOSIS — E66.01 MORBID OBESITY (H): ICD-10-CM

## 2018-05-18 PROBLEM — E78.5 HYPERLIPIDEMIA LDL GOAL <130: Status: ACTIVE | Noted: 2017-11-12

## 2018-05-18 PROCEDURE — 99213 OFFICE O/P EST LOW 20 MIN: CPT | Performed by: PHYSICIAN ASSISTANT

## 2018-05-18 RX ORDER — CLINDAMYCIN HCL 300 MG
300 CAPSULE ORAL 3 TIMES DAILY
Qty: 30 CAPSULE | Refills: 0 | Status: SHIPPED | OUTPATIENT
Start: 2018-05-18 | End: 2018-11-06

## 2018-05-18 ASSESSMENT — PAIN SCALES - GENERAL: PAINLEVEL: NO PAIN (0)

## 2018-05-18 NOTE — MR AVS SNAPSHOT
"              After Visit Summary   5/18/2018    Noris Small    MRN: [id number]           Patient Information     Date Of Birth          1965        Visit Information        Provider Department      5/18/2018 9:40 AM Jensen Nogueira PA-C [facility]        Today's Diagnoses     Felon of finger of right hand    -  1    Hypertension, benign essential, goal below 140/90        Morbid obesity (H)           Follow-ups after your visit        Who to contact     If you have questions or need follow up information about today's clinic visit or your schedule please contact [facility] directly at [phone].  Normal or non-critical lab and imaging results will be communicated to you by SaaSMAXhart, letter or phone within 4 business days after the clinic has received the results. If you do not hear from us within 7 days, please contact the clinic through SaaSMAXhart or phone. If you have a critical or abnormal lab result, we will notify you by phone as soon as possible.  Submit refill requests through MemberConnection or call your pharmacy and they will forward the refill request to us. Please allow 3 business days for your refill to be completed.          Additional Information About Your Visit        MyChart Information     MemberConnection gives you secure access to your electronic health record. If you see a primary care provider, you can also send messages to your care team and make appointments. If you have questions, please call your primary care clinic.  If you do not have a primary care provider, please call [phone] and they will assist you.        Care EveryWhere ID     This is your Care EveryWhere ID. This could be used by other organizations to access your [place] medical records  [id number]        Your Vitals Were     Pulse Temperature Respirations Height BMI (Body Mass Index)       60 97.8  F (36.6  C) (Temporal) 16 5' 9\" (1.753 m) 42.19 kg/m2        Blood Pressure from Last 3 Encounters: "   05/18/18 122/72   02/09/18 128/70   11/03/17 144/87    Weight from Last 3 Encounters:   05/18/18 285 lb 11.2 oz (129.6 kg)   11/03/17 (!) 313 lb (142 kg)   11/03/17 (!) 313 lb 1.6 oz (142 kg)              Today, you had the following     No orders found for display         Today's Medication Changes          These changes are accurate as of 5/18/18  9:52 AM.  If you have any questions, ask your nurse or doctor.               Start taking these medicines.        Dose/Directions    clindamycin 300 MG capsule   Commonly known as:  CLEOCIN   Used for:  Felon of finger of right hand   Started by:  Jensen Nogueira PA-C        Dose:  300 mg   Take 1 capsule (300 mg) by mouth 3 times daily   Quantity:  30 capsule   Refills:  0            Where to get your medicines      These medications were sent to [facility] [place] - [address] [place] [address]  [address] [place] [address], [place] [address]     Phone:  [phone]     clindamycin 300 MG capsule                Primary Care Provider Office Phone # Fax #    Leslee Martinez Magaña, APRN [facility] [phone] [address] [address] [place] [address]        Equal Access to Services     AUSTIN ROBLEDO AH: Hadii sherice ku hadasho Soomaali, waaxda luqadaha, qaybta kaalmada adeegyada, lizz moratayain hayaaeder walker . So [facility] [phone].    ATENCIÓN: Si habla español, tiene a solorzano disposición servicios gratuitos de asistencia lingüística. Pako al [phone].    We comply with applicable federal civil rights laws and [place] laws. We do not discriminate on the basis of race, color, national origin, age, disability, sex, sexual orientation, or gender identity.            Thank you!     Thank you for choosing [facility]  for your care. Our goal is always to provide you with excellent care. Hearing back from our patients is one way we can continue to improve our services. Please take a few minutes to  complete the written survey that you may receive in the mail after your visit with us. Thank you!             Your Updated Medication List - Protect others around you: Learn how to safely use, store and throw away your medicines at www.disposemymeds.org.          This list is accurate as of 5/18/18  9:52 AM.  Always use your most recent med list.                   Brand Name Dispense Instructions for use Diagnosis    ADVIL PO      1 CAPSULE EVERY 4 TO 6 HOURS AS NEEDED        ALEVE PO      1 TABLET EVERY 12 HOURS AS NEEDED        atenolol 50 MG tablet    TENORMIN    90 tablet    Take 1 tablet (50 mg) by mouth daily    Benign essential hypertension, Hyperhidrosis, focal, secondary       clindamycin 300 MG capsule    CLEOCIN    30 capsule    Take 1 capsule (300 mg) by mouth 3 times daily    Felon of finger of right hand       hyoscyamine 0.375 MG 12 hr tablet    LEVBID    30 tablet    TAKE ONE TABLET BY MOUTH EVERY 12 HOURS AS NEEDED FOR CRAMPING    Hyperhidrosis, focal, secondary       MOBIC 15 MG tablet   Generic drug:  meloxicam     30    1 TABLET DAILY

## 2018-07-24 ENCOUNTER — TRANSFERRED RECORDS (OUTPATIENT)
Dept: HEALTH INFORMATION MANAGEMENT | Facility: CLINIC | Age: 53
End: 2018-07-24

## 2018-09-04 ENCOUNTER — TRANSFERRED RECORDS (OUTPATIENT)
Dept: HEALTH INFORMATION MANAGEMENT | Facility: CLINIC | Age: 53
End: 2018-09-04

## 2018-09-18 ENCOUNTER — TRANSFERRED RECORDS (OUTPATIENT)
Dept: HEALTH INFORMATION MANAGEMENT | Facility: CLINIC | Age: 53
End: 2018-09-18

## 2018-10-16 ENCOUNTER — TRANSFERRED RECORDS (OUTPATIENT)
Dept: HEALTH INFORMATION MANAGEMENT | Facility: CLINIC | Age: 53
End: 2018-10-16

## 2018-11-01 NOTE — PATIENT INSTRUCTIONS
Hold Aleve and ibuprofen for 7 days prior to surgery.  ISAAK Bustos         Before Your Surgery      Call your surgeon if there is any change in your health. This includes signs of a cold or flu (such as a sore throat, runny nose, cough, rash or fever).    Do not smoke, drink alcohol or take over the counter medicine (unless your surgeon or primary care doctor tells you to) for the 24 hours before and after surgery.    If you take prescribed drugs: Follow your doctor s orders about which medicines to take and which to stop until after surgery.    Eating and drinking prior to surgery: follow the instructions from your surgeon    Take a shower or bath the night before surgery. Use the soap your surgeon gave you to gently clean your skin. If you do not have soap from your surgeon, use your regular soap. Do not shave or scrub the surgery site.  Wear clean pajamas and have clean sheets on your bed.

## 2018-11-01 NOTE — PROGRESS NOTES
[facility]  [id number] [place] [id number] [phone]  Dept: [phone]    PRE-OP EVALUATION:  Today's date: 2018    Noris Small (: 1965) presents for pre-operative evaluation assessment as requested by Dr. Corrales.  He requires evaluation and anesthesia risk assessment prior to undergoing surgery/procedure for treatment of right thumb Dupuytren's contracture.    Fax number for surgical facility: phone number for [facility]: [phone]  Primary Physician: Leslee Magaña  Type of Anesthesia Anticipated: General    Patient has a Health Care Directive or Living Will:  NO    Preop Questions 2018   Who is doing your surgery? Dr. Corrales   What are you having done? right thumb surgery   Date of Surgery/Procedure: 2018   Facility or Hospital where procedure/surgery will be performed: [facility]   1.  Do you have a history of Heart attack, stroke, stent, coronary bypass surgery, or other heart surgery? No   2.  Do you ever have any pain or discomfort in your chest? No   3.  Do you have a history of  Heart Failure? No   4.   Are you troubled by shortness of breath when:  walking on a level surface, or up a slight hill, or at night? No   5.  Do you currently have a cold, bronchitis or other respiratory infection? No   6.  Do you have a cough, shortness of breath, or wheezing? No   7.  Do you sometimes get pains in the calves of your legs when you walk? No   8. Do you or anyone in your family have previous history of blood clots? No   9.  Do you or does anyone in your family have a serious bleeding problem such as prolonged bleeding following surgeries or cuts? No   10. Have you ever had problems with anemia or been told to take iron pills? No   11. Have you had any abnormal blood loss such as black, tarry or bloody stools? No   12. Have you ever had a blood transfusion? No   13. Have you or any of your relatives ever had  problems with anesthesia? No   14. Do you have sleep apnea, excessive snoring or daytime drowsiness? YES - wears CPAP   15. Do you have any prosthetic heart valves? No   16. Do you have prosthetic joints? YES - bilateral knee replacements       Answers for HPI/ROS submitted by the patient on 11/6/2018   If you checked off any problems, how difficult have these problems made it for you to do your work, take care of things at home, or get along with other people?: Not difficult at all  PHQ9 TOTAL SCORE: 1        HPI:     HPI related to upcoming procedure: Dupuytren's contracture with plan for surgical repair      See problem list for active medical problems.  Problems all longstanding and stable, except as noted/documented.  See ROS for pertinent symptoms related to these conditions.                                                                                                                                                          .    MEDICAL HISTORY:     Patient Active Problem List    Diagnosis Date Noted     Sleep apnea, unspecified type 11/06/2018     Priority: Medium     Hx of recurrent herpes simplex ophthalmicus 11/06/2018     Priority: Medium     History of bilateral knee replacement 11/06/2018     Priority: Medium     History of repair of right rotator cuff 11/06/2018     Priority: Medium     Benign essential hypertension 11/06/2018     Priority: Medium     Dupuytren's contracture of right hand 11/06/2018     Priority: Medium     Primary osteoarthritis involving multiple joints 11/06/2018     Priority: Medium     Morbid obesity (H) 05/18/2018     Priority: Medium     Hyperhidrosis, focal, secondary 11/12/2017     Priority: Medium     Hyperlipidemia LDL goal <130 11/12/2017     Priority: Medium      Past Medical History:   Diagnosis Date     Hypertension, benign essential, goal below 140/90 11/12/2017     Past Surgical History:   Procedure Laterality Date     JOINT REPLACEMTN, KNEE RT/LT       ROTATOR CUFF  "REPAIR RT/LT Right      Current Outpatient Prescriptions   Medication Sig Dispense Refill     ADVIL OR 1 CAPSULE EVERY 4 TO 6 HOURS AS NEEDED       ALEVE OR 1 TABLET EVERY 12 HOURS AS NEEDED       hyoscyamine (LEVBID) 0.375 MG 12 hr tablet TAKE ONE TABLET BY MOUTH EVERY 12 HOURS AS NEEDED FOR CRAMPING 30 tablet 3     atenolol (TENORMIN) 50 MG tablet  Patient NOT CURRENTLY TAKING - he is monitoring his BP at home and it has been in the 120s/80s Take 1 tablet (50 mg) by mouth daily (Patient not taking: Reported on 5/18/2018) 90 tablet 3     MOBIC 15 MG OR TABS 1 TABLET DAILY (Patient not taking: No sig reported) 30 5     OTC products: NSAIDS PRN    Allergies   Allergen Reactions     Penicillins Hives      Latex Allergy: NO    Social History   Substance Use Topics     Smoking status: Never Smoker     Smokeless tobacco: Never Used     Alcohol use Yes      Comment: occ     History   Drug Use No       REVIEW OF SYSTEMS:   CONSTITUTIONAL: NEGATIVE for fever, chills, change in weight  INTEGUMENTARY/SKIN: NEGATIVE for worrisome rashes, moles or lesions  EYES: NEGATIVE for vision changes or irritation  ENT/MOUTH: NEGATIVE for ear, mouth and throat problems  RESP: NEGATIVE for significant cough or SOB  CV: NEGATIVE for chest pain, palpitations or peripheral edema  GI: NEGATIVE for nausea, abdominal pain, heartburn, or change in bowel habits  : NEGATIVE for frequency, dysuria, or hematuria  MUSCULOSKELETAL: POSITIVE for right thumb pain NEGATIVE for other significant arthralgias or myalgia   NEURO: NEGATIVE for weakness, dizziness or paresthesias  ENDOCRINE: NEGATIVE for temperature intolerance, skin/hair changes  HEME: NEGATIVE for bleeding problems  PSYCHIATRIC: NEGATIVE for changes in mood or affect    EXAM:   /82  Pulse 76  Temp 97.1  F (36.2  C) (Temporal)  Resp 16  Ht 5' 9.02\" (1.753 m)  Wt 308 lb 12.8 oz (140.1 kg)  BMI 45.58 kg/m2    GENERAL APPEARANCE: healthy, alert and no distress     EYES: EOMI, PERRL, " small 2 mm opacity of lens on right lateral lower pupil     HENT: ear canals and TM's normal and nose and mouth without ulcers or lesions     NECK: no adenopathy, no asymmetry, masses, or scars and thyroid normal to palpation     RESP: lungs clear to auscultation - no rales, rhonchi or wheezes     CV: regular rates and rhythm, normal S1 S2, no S3 or S4 and no murmur, click or rub     ABDOMEN:  soft, nontender, no HSM or masses and bowel sounds normal     MS: right MCP joint with Duputren's contracture, otherwise extremities normal- no gross deformities noted, no evidence of inflammation in joints, FROM in all extremities.     SKIN: no suspicious lesions or rashes     NEURO: Normal strength and tone, sensory exam grossly normal, mentation intact and speech normal     PSYCH: mentation appears normal. and affect normal/bright     LYMPHATICS: No cervical adenopathy    DIAGNOSTICS:     EKG: Not indicated due to non-vascular surgery and low risk of event (age <65 and without cardiac risk factors)  Labs Resulted Today:     Results for orders placed or performed in visit on 11/06/18   Creatinine   Result Value Ref Range    Creatinine 0.73 0.66 - 1.25 mg/dL    GFR Estimate >90 >60 mL/min/1.7m2    GFR Estimate If Black >90 >60 mL/min/1.7m2      Results for orders placed or performed in visit on 11/06/18   Creatinine   Result Value Ref Range    Creatinine 0.73 0.66 - 1.25 mg/dL    GFR Estimate >90 >60 mL/min/1.7m2    GFR Estimate If Black >90 >60 mL/min/1.7m2       Recent Labs   Lab Test  02/09/18   1330   NA  139   POTASSIUM  4.5   CR  0.86        IMPRESSION:   Reason for surgery/procedure: surgical repair of Dupuytren's contracture of right thumb  Diagnosis/reason for consult: preop    The proposed surgical procedure is considered LOW risk.    REVISED CARDIAC RISK INDEX  The patient has the following serious cardiovascular risks for perioperative complications such as (MI, PE, VFib and 3  AV Block):  No serious cardiac  risks  INTERPRETATION: 0 risks: Class I (very low risk - 0.4% complication rate)    The patient has the following additional risks for perioperative complications:  No identified additional risks  Morbid obesity      ICD-10-CM    1. Preop general physical exam Z01.818    2. Dupuytren's contracture of right hand M72.0    3. Sleep apnea, unspecified type G47.30    4. Mixed hyperlipidemia E78.2 Lipid panel reflex to direct LDL Fasting   5. Benign essential hypertension I10 Creatinine   6. Morbid obesity (H) E66.01    7. Screening for diabetes mellitus Z13.1 Glucose   8. Hx of recurrent herpes simplex ophthalmicus B00.50    9. History of bilateral knee replacement Z96.653    10. History of repair of right rotator cuff Z98.890    11. Primary osteoarthritis involving multiple joints M15.0        RECOMMENDATIONS:     --Consult hospital rounder / IM to assist post-op medical management    Obstructive Sleep Apnea (or suspected sleep apnea)  Patient is clearly advised to use their home CPAP when released from surgery      --Patient is on no chronic medications    APPROVAL GIVEN to proceed with proposed procedure, without further diagnostic evaluation       Signed Electronically by: SHIRLEY Casey CNP    Copy of this evaluation report is provided to requesting physician.    [facility] Preop Guidelines    Revised Cardiac Risk Index

## 2018-11-06 ENCOUNTER — OFFICE VISIT (OUTPATIENT)
Dept: FAMILY MEDICINE | Facility: OTHER | Age: 53
End: 2018-11-06
Payer: COMMERCIAL

## 2018-11-06 VITALS
TEMPERATURE: 97.1 F | DIASTOLIC BLOOD PRESSURE: 82 MMHG | BODY MASS INDEX: 45.74 KG/M2 | HEIGHT: 69 IN | WEIGHT: 308.8 LBS | HEART RATE: 76 BPM | SYSTOLIC BLOOD PRESSURE: 138 MMHG | RESPIRATION RATE: 16 BRPM

## 2018-11-06 DIAGNOSIS — Z13.1 SCREENING FOR DIABETES MELLITUS: ICD-10-CM

## 2018-11-06 DIAGNOSIS — M15.0 PRIMARY OSTEOARTHRITIS INVOLVING MULTIPLE JOINTS: ICD-10-CM

## 2018-11-06 DIAGNOSIS — E66.01 MORBID OBESITY (H): ICD-10-CM

## 2018-11-06 DIAGNOSIS — M72.0 DUPUYTREN'S CONTRACTURE OF RIGHT HAND: ICD-10-CM

## 2018-11-06 DIAGNOSIS — I10 BENIGN ESSENTIAL HYPERTENSION: ICD-10-CM

## 2018-11-06 DIAGNOSIS — B00.50 HERPES SIMPLEX OPHTHALMICUS: ICD-10-CM

## 2018-11-06 DIAGNOSIS — Z98.890 HISTORY OF REPAIR OF RIGHT ROTATOR CUFF: ICD-10-CM

## 2018-11-06 DIAGNOSIS — Z96.653 HISTORY OF BILATERAL KNEE REPLACEMENT: ICD-10-CM

## 2018-11-06 DIAGNOSIS — G47.30 SLEEP APNEA, UNSPECIFIED TYPE: ICD-10-CM

## 2018-11-06 DIAGNOSIS — E78.2 MIXED HYPERLIPIDEMIA: ICD-10-CM

## 2018-11-06 DIAGNOSIS — Z01.818 PREOP GENERAL PHYSICAL EXAM: Primary | ICD-10-CM

## 2018-11-06 LAB
CREAT SERPL-MCNC: 0.73 MG/DL (ref 0.66–1.25)
GFR SERPL CREATININE-BSD FRML MDRD: >90 ML/MIN/1.7M2

## 2018-11-06 PROCEDURE — 99214 OFFICE O/P EST MOD 30 MIN: CPT | Performed by: STUDENT IN AN ORGANIZED HEALTH CARE EDUCATION/TRAINING PROGRAM

## 2018-11-06 PROCEDURE — 82565 ASSAY OF CREATININE: CPT | Performed by: STUDENT IN AN ORGANIZED HEALTH CARE EDUCATION/TRAINING PROGRAM

## 2018-11-06 PROCEDURE — 36415 COLL VENOUS BLD VENIPUNCTURE: CPT | Performed by: STUDENT IN AN ORGANIZED HEALTH CARE EDUCATION/TRAINING PROGRAM

## 2018-11-06 ASSESSMENT — PATIENT HEALTH QUESTIONNAIRE - PHQ9
SUM OF ALL RESPONSES TO PHQ QUESTIONS 1-9: 1
10. IF YOU CHECKED OFF ANY PROBLEMS, HOW DIFFICULT HAVE THESE PROBLEMS MADE IT FOR YOU TO DO YOUR WORK, TAKE CARE OF THINGS AT HOME, OR GET ALONG WITH OTHER PEOPLE: NOT DIFFICULT AT ALL
SUM OF ALL RESPONSES TO PHQ QUESTIONS 1-9: 1

## 2018-11-06 ASSESSMENT — PAIN SCALES - GENERAL: PAINLEVEL: NO PAIN (0)

## 2018-11-06 NOTE — MR AVS SNAPSHOT
After Visit Summary   11/6/2018    Noris Small    MRN: [id number]           Patient Information     Date Of Birth          1965        Visit Information        Provider Department      11/6/2018 7:20 AM Leslee Magaña APRN CNP [facility]        Today's Diagnoses     Preop general physical exam    -  1    Sleep apnea, unspecified type        Mixed hyperlipidemia        Benign essential hypertension        Screening for diabetes mellitus          Care Instructions    Hold Aleve and ibuprofen for 7 days prior to surgery.  Leslee Magaña NP-C         Before Your Surgery      Call your surgeon if there is any change in your health. This includes signs of a cold or flu (such as a sore throat, runny nose, cough, rash or fever).    Do not smoke, drink alcohol or take over the counter medicine (unless your surgeon or primary care doctor tells you to) for the 24 hours before and after surgery.    If you take prescribed drugs: Follow your doctor s orders about which medicines to take and which to stop until after surgery.    Eating and drinking prior to surgery: follow the instructions from your surgeon    Take a shower or bath the night before surgery. Use the soap your surgeon gave you to gently clean your skin. If you do not have soap from your surgeon, use your regular soap. Do not shave or scrub the surgery site.  Wear clean pajamas and have clean sheets on your bed.           Follow-ups after your visit        Future tests that were ordered for you today     Open Future Orders        Priority Expected Expires Ordered    Lipid panel reflex to direct LDL Fasting Routine  11/6/2019 11/6/2018    Glucose Routine  11/6/2019 11/6/2018            Who to contact     If you have questions or need follow up information about today's clinic visit or your schedule please contact [facility] directly at [phone].  Normal or non-critical lab and imaging results will  "be communicated to you by Wellkeeperhart, letter or phone within 4 business days after the clinic has received the results. If you do not hear from us within 7 days, please contact the clinic through farmaciamarket or phone. If you have a critical or abnormal lab result, we will notify you by phone as soon as possible.  Submit refill requests through farmaciamarket or call your pharmacy and they will forward the refill request to us. Please allow 3 business days for your refill to be completed.          Additional Information About Your Visit        farmaciamarket Information     farmaciamarket gives you secure access to your electronic health record. If you see a primary care provider, you can also send messages to your care team and make appointments. If you have questions, please call your primary care clinic.  If you do not have a primary care provider, please call [phone] and they will assist you.        Care EveryWhere ID     This is your Care EveryWhere ID. This could be used by other organizations to access your [place] medical records  [id number]        Your Vitals Were     Pulse Temperature Respirations Height BMI (Body Mass Index)       76 97.1  F (36.2  C) (Temporal) 16 5' 9.02\" (1.753 m) 45.58 kg/m2        Blood Pressure from Last 3 Encounters:   11/06/18 138/82   05/18/18 122/72   02/09/18 128/70    Weight from Last 3 Encounters:   11/06/18 308 lb 12.8 oz (140.1 kg)   05/18/18 285 lb 11.2 oz (129.6 kg)   11/03/17 (!) 313 lb (142 kg)              We Performed the Following     Creatinine        Primary Care Provider Office Phone # Fax #    Leslee Michelle Magaña, APRN [phone] [address] [place] [address]        Equal Access to Services     STUART ROBLEDO : Jenny Borden, cecilia joe, key kaalmada libby, lizz clay. So [facility] [phone].    ATENCIÓN: Si habla español, tiene a solorzano disposición servicios gratuitos de asistencia lingüística. " Pako llamas [phone].    We comply with applicable federal civil rights laws and [place] laws. We do not discriminate on the basis of race, color, national origin, age, disability, sex, sexual orientation, or gender identity.            Thank you!     Thank you for choosing [facility]  for your care. Our goal is always to provide you with excellent care. Hearing back from our patients is one way we can continue to improve our services. Please take a few minutes to complete the written survey that you may receive in the mail after your visit with us. Thank you!             Your Updated Medication List - Protect others around you: Learn how to safely use, store and throw away your medicines at www.disposemymeds.org.          This list is accurate as of 11/6/18  7:47 AM.  Always use your most recent med list.                   Brand Name Dispense Instructions for use Diagnosis    ADVIL PO      1 CAPSULE EVERY 4 TO 6 HOURS AS NEEDED        ALEVE PO      1 TABLET EVERY 12 HOURS AS NEEDED        atenolol 50 MG tablet    TENORMIN    90 tablet    Take 1 tablet (50 mg) by mouth daily    Benign essential hypertension, Hyperhidrosis, focal, secondary       hyoscyamine 0.375 MG 12 hr tablet    LEVBID    30 tablet    TAKE ONE TABLET BY MOUTH EVERY 12 HOURS AS NEEDED FOR CRAMPING    Hyperhidrosis, focal, secondary       MOBIC 15 MG tablet   Generic drug:  meloxicam     30    1 TABLET DAILY

## 2018-11-07 ASSESSMENT — PATIENT HEALTH QUESTIONNAIRE - PHQ9: SUM OF ALL RESPONSES TO PHQ QUESTIONS 1-9: 1

## 2018-11-08 ENCOUNTER — TELEPHONE (OUTPATIENT)
Dept: FAMILY MEDICINE | Facility: OTHER | Age: 53
End: 2018-11-08

## 2018-11-08 NOTE — TELEPHONE ENCOUNTER
Please fax preop from 11/6/18 to Dr. Corrales at [facility]. Surgery is going to be done at [facility]. Patient was unsure of fax number.    Thanks,  Leslee Magaña, CNP

## 2018-11-08 NOTE — TELEPHONE ENCOUNTER
Preop has been faxed to Dr Corrales fax#[phone]  Closing encounter  Raegan Galvez RT (R)

## 2018-11-30 ENCOUNTER — TRANSFERRED RECORDS (OUTPATIENT)
Dept: HEALTH INFORMATION MANAGEMENT | Facility: CLINIC | Age: 53
End: 2018-11-30

## 2018-12-21 ENCOUNTER — TRANSFERRED RECORDS (OUTPATIENT)
Dept: HEALTH INFORMATION MANAGEMENT | Facility: CLINIC | Age: 53
End: 2018-12-21

## 2019-02-01 ENCOUNTER — TRANSFERRED RECORDS (OUTPATIENT)
Dept: HEALTH INFORMATION MANAGEMENT | Facility: CLINIC | Age: 54
End: 2019-02-01

## 2019-06-21 ENCOUNTER — TRANSFERRED RECORDS (OUTPATIENT)
Dept: HEALTH INFORMATION MANAGEMENT | Facility: CLINIC | Age: 54
End: 2019-06-21

## 2019-10-29 PROBLEM — Z96.653 HISTORY OF BILATERAL KNEE REPLACEMENT: Status: RESOLVED | Noted: 2018-11-06 | Resolved: 2019-10-29

## 2019-10-29 PROBLEM — Z98.890 HISTORY OF REPAIR OF RIGHT ROTATOR CUFF: Status: RESOLVED | Noted: 2018-11-06 | Resolved: 2019-10-29

## 2019-10-29 PROBLEM — E78.5 HYPERLIPIDEMIA LDL GOAL <130: Chronic | Status: ACTIVE | Noted: 2017-11-12

## 2019-10-29 PROBLEM — E66.01 MORBID OBESITY (H): Chronic | Status: ACTIVE | Noted: 2018-05-18

## 2019-10-29 PROBLEM — I10 BENIGN ESSENTIAL HYPERTENSION: Chronic | Status: ACTIVE | Noted: 2018-11-06

## 2019-11-01 ENCOUNTER — OFFICE VISIT (OUTPATIENT)
Dept: SLEEP MEDICINE | Facility: CLINIC | Age: 54
End: 2019-11-01
Payer: COMMERCIAL

## 2019-11-01 VITALS
BODY MASS INDEX: 41.52 KG/M2 | DIASTOLIC BLOOD PRESSURE: 87 MMHG | HEIGHT: 70 IN | WEIGHT: 290 LBS | HEART RATE: 63 BPM | SYSTOLIC BLOOD PRESSURE: 137 MMHG | OXYGEN SATURATION: 97 %

## 2019-11-01 DIAGNOSIS — G47.33 OSA (OBSTRUCTIVE SLEEP APNEA): Primary | ICD-10-CM

## 2019-11-01 PROCEDURE — 99214 OFFICE O/P EST MOD 30 MIN: CPT | Performed by: PHYSICIAN ASSISTANT

## 2019-11-01 ASSESSMENT — MIFFLIN-ST. JEOR: SCORE: 2171.68

## 2019-11-01 NOTE — PATIENT INSTRUCTIONS
Your BMI is Body mass index is 41.61 kg/m .  Weight management is a personal decision.  If you are interested in exploring weight loss strategies, the following discussion covers the approaches that may be successful. Body mass index (BMI) is one way to tell whether you are at a healthy weight, overweight, or obese. It measures your weight in relation to your height.  A BMI of 18.5 to 24.9 is in the healthy range. A person with a BMI of 25 to 29.9 is considered overweight, and someone with a BMI of 30 or greater is considered obese. More than two-thirds of American adults are considered overweight or obese.  Being overweight or obese increases the risk for further weight gain. Excess weight may lead to heart disease and diabetes.  Creating and following plans for healthy eating and physical activity may help you improve your health.  Weight control is part of healthy lifestyle and includes exercise, emotional health, and healthy eating habits. Careful eating habits lifelong are the mainstay of weight control. Though there are significant health benefits from weight loss, long-term weight loss with diet alone may be very difficult to achieve- studies show long-term success with dietary management in less than 10% of people. Attaining a healthy weight may be especially difficult to achieve in those with severe obesity. In some cases, medications, devices and surgical management might be considered.  What can you do?  If you are overweight or obese and are interested in methods for weight loss, you should discuss this with your provider.     Consider reducing daily calorie intake by 500 calories.     Keep a food journal.     Avoiding skipping meals, consider cutting portions instead.    Diet combined with exercise helps maintain muscle while optimizing fat loss. Strength training is particularly important for building and maintaining muscle mass. Exercise helps reduce stress, increase energy, and improves fitness.  Increasing exercise without diet control, however, may not burn enough calories to loose weight.       Start walking three days a week 10-20 minutes at a time    Work towards walking thirty minutes five days a week     Eventually, increase the speed of your walking for 1-2 minutes at time    In addition, we recommend that you review healthy lifestyles and methods for weight loss available through the National Institutes of Health patient information sites:  http://win.niddk.nih.gov/publications/index.htm    And look into health and wellness programs that may be available through your health insurance provider, employer, local community center, or [employer].    Weight management plan: Patient was referred to their PCP to discuss a diet and exercise plan.

## 2019-11-01 NOTE — PATIENT INSTRUCTIONS
1. Call to schedule an appointment for hearing evaluation.    2. I will send you the results of the blood work and fax the lab results to Dr. Corbin.    Leslee Magaña, NP-C      Preventive Health Recommendations  Male Ages 50 - 64    Yearly exam:             See your health care provider every year in order to  o   Review health changes.   o   Discuss preventive care.    o   Review your medicines if your doctor has prescribed any.     Have a cholesterol test every 5 years, or more frequently if you are at risk for high cholesterol/heart disease.     Have a diabetes test (fasting glucose) every three years. If you are at risk for diabetes, you should have this test more often.     Have a colonoscopy at age 50, or have a yearly FIT test (stool test). These exams will check for colon cancer.      Talk with your health care provider about whether or not a prostate cancer screening test (PSA) is right for you.    You should be tested each year for STDs (sexually transmitted diseases), if you re at risk.     Shots: Get a flu shot each year. Get a tetanus shot every 10 years.     Nutrition:    Eat at least 5 servings of fruits and vegetables daily.     Eat whole-grain bread, whole-wheat pasta and brown rice instead of white grains and rice.     Get adequate Calcium and Vitamin D.     Lifestyle    Exercise for at least 150 minutes a week (30 minutes a day, 5 days a week). This will help you control your weight and prevent disease.     Limit alcohol to one drink per day.     No smoking.     Wear sunscreen to prevent skin cancer.     See your dentist every six months for an exam and cleaning.     See your eye doctor every 1 to 2 years.

## 2019-11-01 NOTE — PROGRESS NOTES
Obstructive Sleep Apnea - PAP Follow-Up Visit:    Chief Complaint   Patient presents with     Sleep Problem     consult- Check in       Noris Geovanny comes in today for follow-up of their mild sleep apnea, managed with CPAP.     Initially presented to [facility] for snoring and excessive daytime sleepiness.     HST on 3/18/2015 (287#)-AHI 13, KOURTNEY 11, lowest oxygen saturation was 86%    Established care with [facility] in 2017.    Overall, he rates the experience with PAP as 7 (0 poor, 10 great). The mask is comfortable.  The mask is not leaking He is snoring with the mask on. He is not having gasp arousals.  He is having significant oral/nasal dryness. The pressure is comfortable.     His PAP interface is Nasal Mask.    Bedtime is typically 2200. Usually it takes about 30 min minutes to fall asleep with the mask on. Wake time is typically 0530.  Patient is using PAP therapy 6 hours per night. The patient is usually getting 6 hours of sleep per night.    He does feel rested in the morning.    Total score - Yoder: 2 (11/1/2019  7:00 AM)      LACI Total Score: 6    Respironics  Auto-PAP 7 - 17 cmH2O 30 day usage data:    86.7% of days with > 4 hours of use. 1/30 days with no use.   Average use 5 hours 56 minutes per day.   Average % of night in large leak 0 seconds.    CPAP 90% pressure 9.1cm.   AHI 2.1 events per hour.      Past medical/surgical history, family history, social history, medications and allergies were reviewed.      Problem List:  Patient Active Problem List    Diagnosis Date Noted     LUIS ALFREDO (obstructive sleep apnea) 11/06/2018     Priority: Medium     HST on 3/18/2015 (287#)-AHI 13, KOURTNEY 11, lowest oxygen saturation was 86%       Hx of recurrent herpes simplex ophthalmicus 11/06/2018     Priority: Medium     Benign essential hypertension 11/06/2018     Priority: Medium     Dupuytren's contracture of right hand 11/06/2018     Priority: Medium     Primary osteoarthritis involving multiple joints  "11/06/2018     Priority: Medium     Morbid obesity (H) 05/18/2018     Priority: Medium     Hyperhidrosis, focal, secondary 11/12/2017     Priority: Medium     Hyperlipidemia LDL goal <130 11/12/2017     Priority: Medium        /87   Pulse 63   Ht 1.778 m (5' 10\")   Wt 131.5 kg (290 lb)   SpO2 97%   BMI 41.61 kg/m      Impression/Plan:    Mild obstructive sleep apnea-  Tolerating PAP well. Daytime symptoms are improved.   He wants to obtain a new CPAP. His machine is more than 5 years old and worn. The knob is broken.   Comprehensive DME order placed for a replacement CPAP.     Noris Small will follow up in about 7 week(s).     Twenty-five minutes spent with patient, all of which were spent face-to-face counseling, consulting, coordinating plan of care.      Gordon Rajput PA-C    "

## 2019-11-01 NOTE — PROGRESS NOTES
SUBJECTIVE:   CC: Noris Small is an 54 year old male who presents for preventative health visit.   .[facility]  Patient is eligible for use of low-dose aspirin for primary prevention of heart attack and stroke.  Provider has discussed aspirin with patient and our decision was:     Healthy Habits:     Getting at least 3 servings of Calcium per day:  Yes    Bi-annual eye exam:  Yes    Dental care twice a year:  Yes    Sleep apnea or symptoms of sleep apnea:  Sleep apnea    Diet:  Other    Frequency of exercise:  None    Taking medications regularly:  Yes    PHQ-2 Total Score: 0    Additional concerns today:  Yes (labs for knee pain (cbc with diff, crp, esr))  right knee 6 years ago    Dr. Eliezer [place], [phone] recommended he have labs done to rule out infection in right knee    Today's PHQ-2 Score:   PHQ-2 ( 1999 Pf[employer]) 11/3/2017   Q1: Little interest or pleasure in doing things 0   Q2: Feeling down, depressed or hopeless 0   PHQ-2 Score 0   Q1: Little interest or pleasure in doing things Not at all   Q2: Feeling down, depressed or hopeless Not at all   PHQ-2 Score 0     Abuse: Current or Past(Physical, Sexual or Emotional)- No  Do you feel safe in your environment? Yes        Social History     Tobacco Use     Smoking status: Never Smoker     Smokeless tobacco: Never Used   Substance Use Topics     Alcohol use: Yes     Comment: occ     If you drink alcohol do you typically have >3 drinks per day or >7 drinks per week? No    Alcohol Use 11/3/2017   Prescreen: >3 drinks/day or >7 drinks/week? The patient does not drink >3 drinks per day nor >7 drinks per week.     Last PSA:   PSA   Date Value Ref Range Status   11/03/2017 0.63 0 - 4 ug/L Final     Comment:     Assay Method:  Chemiluminescence using Siemens Vista analyzer       Reviewed orders with patient. Reviewed health maintenance and updated orders accordingly - Yes  Lab work is in process  Labs reviewed in EPIC  BP Readings from Last 3 Encounters:    11/08/19 136/78   11/01/19 137/87   11/06/18 138/82    Wt Readings from Last 3 Encounters:   11/08/19 129.7 kg (286 lb)   11/01/19 131.5 kg (290 lb)   11/06/18 140.1 kg (308 lb 12.8 oz)                  Patient Active Problem List   Diagnosis     Hyperhidrosis, focal, secondary     Hyperlipidemia LDL goal <130     Morbid obesity (H)     LUIS ALFREDO (obstructive sleep apnea)     Hx of recurrent herpes simplex ophthalmicus     Benign essential hypertension     Dupuytren's contracture of right hand     Primary osteoarthritis involving multiple joints     Past Surgical History:   Procedure Laterality Date     JOINT REPLACEMTN, KNEE RT/LT       ROTATOR CUFF REPAIR RT/LT Right        Social History     Tobacco Use     Smoking status: Never Smoker     Smokeless tobacco: Never Used   Substance Use Topics     Alcohol use: Yes     Comment: occ     Family History   Problem Relation Age of Onset     Polycystic Kidney Diease Mother      Diabetes Father      Thyroid Disease Father      Diabetes Brother          Current Outpatient Medications   Medication Sig Dispense Refill     ADVIL OR 1 CAPSULE EVERY 4 TO 6 HOURS AS NEEDED       ALEVE OR 1 TABLET EVERY 12 HOURS AS NEEDED       clindamycin (CLEOCIN) 300 MG capsule Take 300 mg by mouth 3 times daily       hyoscyamine (LEVBID) 0.375 MG 12 hr tablet TAKE ONE TABLET BY MOUTH EVERY 12 HOURS AS NEEDED FOR CRAMPING 30 tablet 3     valACYclovir (VALTREX) 1000 mg tablet Take 2 tablets by mouth at the first sign of symptoms then in 12 hours take another 2 tablets. 30 tablet 3     Allergies   Allergen Reactions     Penicillins Hives       Reviewed and updated as needed this visit by clinical staff         Reviewed and updated as needed this visit by Provider        Past Medical History:   Diagnosis Date     History of bilateral knee replacement 11/6/2018     History of repair of right rotator cuff 11/6/2018     Hypertension, benign essential, goal below 140/90 11/12/2017      Past Surgical  History:   Procedure Laterality Date     JOINT REPLACEMTN, KNEE RT/LT       ROTATOR CUFF REPAIR RT/LT Right        Review of Systems   Constitutional: Negative for chills and fever.   HENT: Negative for congestion, ear pain, hearing loss and sore throat.    Eyes: Negative for pain and visual disturbance.   Respiratory: Negative for cough and shortness of breath.    Cardiovascular: Negative for chest pain, palpitations and peripheral edema.   Gastrointestinal: Negative for abdominal pain, constipation, diarrhea, heartburn, hematochezia and nausea.   Genitourinary: Negative for discharge, dysuria, frequency, genital sores, hematuria, impotence and urgency.   Musculoskeletal: Positive for arthralgias. Negative for joint swelling and myalgias.   Skin: Negative for rash.   Neurological: Negative for dizziness, weakness, headaches and paresthesias.   Psychiatric/Behavioral: Negative for mood changes. The patient is not nervous/anxious.        OBJECTIVE:   There were no vitals taken for this visit.    Physical Exam  GENERAL: alert, no distress and obese  EYES: Eyes grossly normal to inspection, PERRL and conjunctivae and sclerae normal  HENT: ear canals and TM's normal, nose and mouth without ulcers or lesions  NECK: no adenopathy, no asymmetry, masses, or scars and thyroid normal to palpation  RESP: lungs clear to auscultation - no rales, rhonchi or wheezes  CV: regular rate and rhythm, normal S1 S2, no S3 or S4, no murmur, click or rub, no peripheral edema   ABDOMEN: soft, nontender, no hepatosplenomegaly, no masses and bowel sounds normal  MS: no gross musculoskeletal defects noted, no edema  SKIN: no suspicious lesions or rashes  NEURO: Normal strength and tone, mentation intact and speech normal  PSYCH: mentation appears normal, affect normal/bright    Diagnostic Test Results:  Labs reviewed in Epic  Results for orders placed or performed in visit on 11/08/19   Lipid panel reflex to direct LDL Fasting     Status:  Abnormal   Result Value Ref Range    Cholesterol 166 <200 mg/dL    Triglycerides 108 <150 mg/dL    HDL Cholesterol 36 (L) >39 mg/dL    LDL Cholesterol Calculated 108 (H) <100 mg/dL    Non HDL Cholesterol 130 (H) <130 mg/dL   BASIC METABOLIC PANEL     Status: Abnormal   Result Value Ref Range    Sodium 142 133 - 144 mmol/L    Potassium 4.3 3.4 - 5.3 mmol/L    Chloride 110 (H) 94 - 109 mmol/L    Carbon Dioxide 25 20 - 32 mmol/L    Anion Gap 7 3 - 14 mmol/L    Glucose 105 (H) 70 - 99 mg/dL    Urea Nitrogen 13 7 - 30 mg/dL    Creatinine 0.84 0.66 - 1.25 mg/dL    GFR Estimate >90 >60 mL/min/[1.73_m2]    GFR Estimate If Black >90 >60 mL/min/[1.73_m2]    Calcium 8.7 8.5 - 10.1 mg/dL   CBC with platelets and differential     Status: None   Result Value Ref Range    WBC 7.4 4.0 - 11.0 10e9/L    RBC Count 5.44 4.4 - 5.9 10e12/L    Hemoglobin 15.8 13.3 - 17.7 g/dL    Hematocrit 48.4 40.0 - 53.0 %    MCV 89 78 - 100 fl    MCH 29.0 26.5 - 33.0 pg    MCHC 32.6 31.5 - 36.5 g/dL    RDW 13.4 10.0 - 15.0 %    Platelet Count 229 150 - 450 10e9/L    % Neutrophils 61.3 %    % Lymphocytes 25.5 %    % Monocytes 10.1 %    % Eosinophils 2.7 %    % Basophils 0.4 %    Absolute Neutrophil 4.5 1.6 - 8.3 10e9/L    Absolute Lymphocytes 1.9 0.8 - 5.3 10e9/L    Absolute Monocytes 0.8 0.0 - 1.3 10e9/L    Absolute Eosinophils 0.2 0.0 - 0.7 10e9/L    Absolute Basophils 0.0 0.0 - 0.2 10e9/L    Diff Method Automated Method    CRP, inflammation     Status: Abnormal   Result Value Ref Range    CRP Inflammation 17.4 (H) 0.0 - 8.0 mg/L   ESR: Erythrocyte sedimentation rate     Status: None   Result Value Ref Range    Sed Rate 8 0 - 20 mm/h       ASSESSMENT/PLAN:   1. Routine general medical examination at a health care facility  Doing well. Has been losing weight.    2. Acute pain of right knee  R/o possible infectious process per orthopedic at [facility]. I have faxed the results of his blood tests. He has elevated CRP but otherwise normal labs.  - CBC  "with platelets and differential  - CRP, inflammation  - ESR: Erythrocyte sedimentation rate    3. Hearing problem of both ears  - AUDIOLOGY ADULT REFERRAL    4. Family history of polycystic kidney  Mom has this diagnosis.     5. Screening for diabetes mellitus  - BASIC METABOLIC PANEL    6. Lipid screening  - Lipid panel reflex to direct LDL Fasting    7. Need for prophylactic vaccination and inoculation against influenza  - INFLUENZA QUAD, RECOMBINANT, P-FREE (RIV4) (FLUBLOCK) [82100]  - Vaccine Administration, Initial [42993]  The 10-year ASCVD risk score (Johana PIZANO Jr., et al., 2013) is: 6%    Values used to calculate the score:      Age: 54 years      Sex: Male      Is Non- : No      Diabetic: No      Tobacco smoker: No      Systolic Blood Pressure: 136 mmHg      Is BP treated: No      HDL Cholesterol: 38 mg/dL      Total Cholesterol: 197 mg/dL    COUNSELING:   Reviewed preventive health counseling, as reflected in patient instructions       Regular exercise       Healthy diet/nutrition       Hearing screening       Immunizations    Vaccinated for: Influenza             Colon cancer screening    Estimated body mass index is 41.61 kg/m  as calculated from the following:    Height as of 11/1/19: 1.778 m (5' 10\").    Weight as of 11/1/19: 131.5 kg (290 lb).     Weight management plan: Discussed healthy diet and exercise guidelines     reports that he has never smoked. He has never used smokeless tobacco.      Counseling Resources:  ATP IV Guidelines  Pooled Cohorts Equation Calculator  FRAX Risk Assessment  ICSI Preventive Guidelines  Dietary Guidelines for Americans, 2010  USDA's MyPlate  ASA Prophylaxis  Lung CA Screening    Leslee Magaña, SHIRLEY [facility]  "

## 2019-11-01 NOTE — NURSING NOTE
"Chief Complaint   Patient presents with     Sleep Problem     consult- Check in       Initial /87   Pulse 63   Ht 1.778 m (5' 10\")   Wt 131.5 kg (290 lb)   SpO2 97%   BMI 41.61 kg/m   Estimated body mass index is 41.61 kg/m  as calculated from the following:    Height as of this encounter: 1.778 m (5' 10\").    Weight as of this encounter: 131.5 kg (290 lb).    Medication Reconciliation: complete    Neck circumference: 17.25 inches / 44 centimeters.    DME: [facility]     "

## 2019-11-07 ENCOUNTER — TRANSFERRED RECORDS (OUTPATIENT)
Dept: HEALTH INFORMATION MANAGEMENT | Facility: CLINIC | Age: 54
End: 2019-11-07

## 2019-11-08 ENCOUNTER — OFFICE VISIT (OUTPATIENT)
Dept: FAMILY MEDICINE | Facility: OTHER | Age: 54
End: 2019-11-08
Payer: COMMERCIAL

## 2019-11-08 ENCOUNTER — TELEPHONE (OUTPATIENT)
Dept: FAMILY MEDICINE | Facility: OTHER | Age: 54
End: 2019-11-08

## 2019-11-08 ENCOUNTER — HEALTH MAINTENANCE LETTER (OUTPATIENT)
Age: 54
End: 2019-11-08

## 2019-11-08 VITALS
SYSTOLIC BLOOD PRESSURE: 136 MMHG | DIASTOLIC BLOOD PRESSURE: 78 MMHG | OXYGEN SATURATION: 96 % | HEART RATE: 70 BPM | WEIGHT: 286 LBS | RESPIRATION RATE: 16 BRPM | HEIGHT: 69 IN | BODY MASS INDEX: 42.36 KG/M2 | TEMPERATURE: 98.3 F

## 2019-11-08 DIAGNOSIS — Z13.1 SCREENING FOR DIABETES MELLITUS: ICD-10-CM

## 2019-11-08 DIAGNOSIS — H91.93 HEARING PROBLEM OF BOTH EARS: ICD-10-CM

## 2019-11-08 DIAGNOSIS — Z82.71 FAMILY HISTORY OF POLYCYSTIC KIDNEY: ICD-10-CM

## 2019-11-08 DIAGNOSIS — L74.52: ICD-10-CM

## 2019-11-08 DIAGNOSIS — M25.561 ACUTE PAIN OF RIGHT KNEE: ICD-10-CM

## 2019-11-08 DIAGNOSIS — Z23 NEED FOR PROPHYLACTIC VACCINATION AND INOCULATION AGAINST INFLUENZA: ICD-10-CM

## 2019-11-08 DIAGNOSIS — Z13.220 LIPID SCREENING: ICD-10-CM

## 2019-11-08 DIAGNOSIS — Z96.659 STATUS POST TOTAL KNEE REPLACEMENT, UNSPECIFIED LATERALITY: Primary | ICD-10-CM

## 2019-11-08 DIAGNOSIS — B00.1 RECURRENT COLD SORES: ICD-10-CM

## 2019-11-08 DIAGNOSIS — Z00.00 ROUTINE GENERAL MEDICAL EXAMINATION AT A HEALTH CARE FACILITY: Primary | ICD-10-CM

## 2019-11-08 LAB
ANION GAP SERPL CALCULATED.3IONS-SCNC: 7 MMOL/L (ref 3–14)
BASOPHILS # BLD AUTO: 0 10E9/L (ref 0–0.2)
BASOPHILS NFR BLD AUTO: 0.4 %
BUN SERPL-MCNC: 13 MG/DL (ref 7–30)
CALCIUM SERPL-MCNC: 8.7 MG/DL (ref 8.5–10.1)
CHLORIDE SERPL-SCNC: 110 MMOL/L (ref 94–109)
CHOLEST SERPL-MCNC: 166 MG/DL
CO2 SERPL-SCNC: 25 MMOL/L (ref 20–32)
CREAT SERPL-MCNC: 0.84 MG/DL (ref 0.66–1.25)
CRP SERPL-MCNC: 17.4 MG/L (ref 0–8)
DIFFERENTIAL METHOD BLD: NORMAL
EOSINOPHIL # BLD AUTO: 0.2 10E9/L (ref 0–0.7)
EOSINOPHIL NFR BLD AUTO: 2.7 %
ERYTHROCYTE [DISTWIDTH] IN BLOOD BY AUTOMATED COUNT: 13.4 % (ref 10–15)
ERYTHROCYTE [SEDIMENTATION RATE] IN BLOOD BY WESTERGREN METHOD: 8 MM/H (ref 0–20)
GFR SERPL CREATININE-BSD FRML MDRD: >90 ML/MIN/{1.73_M2}
GLUCOSE SERPL-MCNC: 105 MG/DL (ref 70–99)
HCT VFR BLD AUTO: 48.4 % (ref 40–53)
HDLC SERPL-MCNC: 36 MG/DL
HGB BLD-MCNC: 15.8 G/DL (ref 13.3–17.7)
LDLC SERPL CALC-MCNC: 108 MG/DL
LYMPHOCYTES # BLD AUTO: 1.9 10E9/L (ref 0.8–5.3)
LYMPHOCYTES NFR BLD AUTO: 25.5 %
MCH RBC QN AUTO: 29 PG (ref 26.5–33)
MCHC RBC AUTO-ENTMCNC: 32.6 G/DL (ref 31.5–36.5)
MCV RBC AUTO: 89 FL (ref 78–100)
MONOCYTES # BLD AUTO: 0.8 10E9/L (ref 0–1.3)
MONOCYTES NFR BLD AUTO: 10.1 %
NEUTROPHILS # BLD AUTO: 4.5 10E9/L (ref 1.6–8.3)
NEUTROPHILS NFR BLD AUTO: 61.3 %
NONHDLC SERPL-MCNC: 130 MG/DL
PLATELET # BLD AUTO: 229 10E9/L (ref 150–450)
POTASSIUM SERPL-SCNC: 4.3 MMOL/L (ref 3.4–5.3)
RBC # BLD AUTO: 5.44 10E12/L (ref 4.4–5.9)
SODIUM SERPL-SCNC: 142 MMOL/L (ref 133–144)
TRIGL SERPL-MCNC: 108 MG/DL
WBC # BLD AUTO: 7.4 10E9/L (ref 4–11)

## 2019-11-08 PROCEDURE — 80048 BASIC METABOLIC PNL TOTAL CA: CPT | Performed by: STUDENT IN AN ORGANIZED HEALTH CARE EDUCATION/TRAINING PROGRAM

## 2019-11-08 PROCEDURE — 86140 C-REACTIVE PROTEIN: CPT | Performed by: STUDENT IN AN ORGANIZED HEALTH CARE EDUCATION/TRAINING PROGRAM

## 2019-11-08 PROCEDURE — 85025 COMPLETE CBC W/AUTO DIFF WBC: CPT | Performed by: STUDENT IN AN ORGANIZED HEALTH CARE EDUCATION/TRAINING PROGRAM

## 2019-11-08 PROCEDURE — 36415 COLL VENOUS BLD VENIPUNCTURE: CPT | Performed by: STUDENT IN AN ORGANIZED HEALTH CARE EDUCATION/TRAINING PROGRAM

## 2019-11-08 PROCEDURE — 90682 RIV4 VACC RECOMBINANT DNA IM: CPT | Performed by: STUDENT IN AN ORGANIZED HEALTH CARE EDUCATION/TRAINING PROGRAM

## 2019-11-08 PROCEDURE — 99396 PREV VISIT EST AGE 40-64: CPT | Mod: 25 | Performed by: STUDENT IN AN ORGANIZED HEALTH CARE EDUCATION/TRAINING PROGRAM

## 2019-11-08 PROCEDURE — 80061 LIPID PANEL: CPT | Performed by: STUDENT IN AN ORGANIZED HEALTH CARE EDUCATION/TRAINING PROGRAM

## 2019-11-08 PROCEDURE — 85652 RBC SED RATE AUTOMATED: CPT | Performed by: STUDENT IN AN ORGANIZED HEALTH CARE EDUCATION/TRAINING PROGRAM

## 2019-11-08 PROCEDURE — 90471 IMMUNIZATION ADMIN: CPT | Performed by: STUDENT IN AN ORGANIZED HEALTH CARE EDUCATION/TRAINING PROGRAM

## 2019-11-08 RX ORDER — HYOSCYAMINE SULFATE 0.38 MG/1
TABLET, EXTENDED RELEASE ORAL
Qty: 30 TABLET | Refills: 5 | Status: SHIPPED | OUTPATIENT
Start: 2019-11-08 | End: 2021-12-10

## 2019-11-08 RX ORDER — VALACYCLOVIR HYDROCHLORIDE 1 G/1
TABLET, FILM COATED ORAL
Qty: 30 TABLET | Refills: 3 | Status: SHIPPED | OUTPATIENT
Start: 2019-11-08

## 2019-11-08 RX ORDER — CLINDAMYCIN HCL 300 MG
300 CAPSULE ORAL 3 TIMES DAILY
Status: CANCELLED | OUTPATIENT
Start: 2019-11-08

## 2019-11-08 RX ORDER — CLINDAMYCIN HCL 300 MG
300 CAPSULE ORAL 3 TIMES DAILY
COMMUNITY
End: 2019-11-08

## 2019-11-08 RX ORDER — CLINDAMYCIN HCL 300 MG
CAPSULE ORAL
Qty: 2 CAPSULE | Refills: 1 | Status: SHIPPED | OUTPATIENT
Start: 2019-11-08

## 2019-11-08 ASSESSMENT — ENCOUNTER SYMPTOMS
NAUSEA: 0
HEMATOCHEZIA: 0
SHORTNESS OF BREATH: 0
HEARTBURN: 0
CHILLS: 0
DIARRHEA: 0
JOINT SWELLING: 0
FREQUENCY: 0
HEMATURIA: 0
CONSTIPATION: 0
COUGH: 0
ARTHRALGIAS: 1
EYE PAIN: 0
ABDOMINAL PAIN: 0
MYALGIAS: 0
DYSURIA: 0
HEADACHES: 0
SORE THROAT: 0
WEAKNESS: 0
DIZZINESS: 0
FEVER: 0
PARESTHESIAS: 0
PALPITATIONS: 0
NERVOUS/ANXIOUS: 0

## 2019-11-08 ASSESSMENT — MIFFLIN-ST. JEOR: SCORE: 2136.05

## 2019-11-08 NOTE — TELEPHONE ENCOUNTER
Pt take it for dental work as he has had a previous knee replacement.     He does not have any dental appts upcoming but is hoping to have a refill at the pharmacy for the times he needs them.

## 2019-11-08 NOTE — TELEPHONE ENCOUNTER
PT was seen today and he had forgot to mention that he needs these three medications refilled. Please advise.     clindamycin (CLEOCIN) 300 MG capsule   valACYclovir (VALTREX) 1000 mg tablet  hyoscyamine (LEVBID) 0.375 MG 12 hr tablet

## 2019-11-11 ENCOUNTER — TELEPHONE (OUTPATIENT)
Dept: FAMILY MEDICINE | Facility: OTHER | Age: 54
End: 2019-11-11

## 2019-11-11 ENCOUNTER — DOCUMENTATION ONLY (OUTPATIENT)
Dept: SLEEP MEDICINE | Facility: CLINIC | Age: 54
End: 2019-11-11
Payer: COMMERCIAL

## 2019-11-11 DIAGNOSIS — G47.33 OSA (OBSTRUCTIVE SLEEP APNEA): ICD-10-CM

## 2019-11-11 NOTE — PROGRESS NOTES
Patient was offered choice of vendor and chose [facility].  Patient Noris Small was set up at [facility] on November 11, 2019. Patient received a Resmed AirSense 10 Auto. Pressures were set at 7-12 cm H2O.   Patient s ramp is 5 cm H2O for Auto and FLEX/EPR is EPR.  Patient did not receive any supplies other than machine because he is not eligible until 12/25/19 for mask, tubing and filters.  Patient is enrolled in the STM Program and does not need to meet compliance. Patient has a follow up on tbd with RICHAR Cheung

## 2019-11-14 ENCOUNTER — DOCUMENTATION ONLY (OUTPATIENT)
Dept: SLEEP MEDICINE | Facility: CLINIC | Age: 54
End: 2019-11-14
Payer: COMMERCIAL

## 2019-11-14 DIAGNOSIS — G47.33 OSA (OBSTRUCTIVE SLEEP APNEA): ICD-10-CM

## 2019-11-14 NOTE — PROGRESS NOTES
3 DAY STM VISIT    Diagnostic AHI: 13 HST    Patient contacted for 3 day STM visit  Subjective measures:  Patient out of town and took his old machine. Patient feels like he doesn't need STM.     Replacement device: Yes    STM ordered by provider: Yes     Device type: Auto-CPAP  PAP settings from order::  CPAP min 7 cm  H20       CPAP max 12 cm  H20  Mask type:    Nasal Mask     Device settings from machine  Assessment: No usage reporting but has a profile in [employer].  Action plan: Patient removed from STM, STM not required or ordered. . Patient has a follow up visit scheduled:   no.    Total time spent on remote patient monitoring data analysis and patient contact today:   13 minutes

## 2019-11-15 ENCOUNTER — OFFICE VISIT (OUTPATIENT)
Dept: AUDIOLOGY | Facility: CLINIC | Age: 54
End: 2019-11-15
Payer: COMMERCIAL

## 2019-11-15 ENCOUNTER — OFFICE VISIT (OUTPATIENT)
Dept: FAMILY MEDICINE | Facility: OTHER | Age: 54
End: 2019-11-15
Payer: COMMERCIAL

## 2019-11-15 VITALS
OXYGEN SATURATION: 95 % | SYSTOLIC BLOOD PRESSURE: 122 MMHG | WEIGHT: 285 LBS | RESPIRATION RATE: 16 BRPM | DIASTOLIC BLOOD PRESSURE: 68 MMHG | HEIGHT: 70 IN | HEART RATE: 62 BPM | BODY MASS INDEX: 40.8 KG/M2 | TEMPERATURE: 97.7 F

## 2019-11-15 DIAGNOSIS — R21 RASH: Primary | ICD-10-CM

## 2019-11-15 DIAGNOSIS — H90.3 SENSORINEURAL HEARING LOSS, BILATERAL: Primary | ICD-10-CM

## 2019-11-15 PROCEDURE — 99213 OFFICE O/P EST LOW 20 MIN: CPT | Performed by: PHYSICIAN ASSISTANT

## 2019-11-15 PROCEDURE — 99207 ZZC NO CHARGE LOS: CPT | Performed by: AUDIOLOGIST

## 2019-11-15 PROCEDURE — 92557 COMPREHENSIVE HEARING TEST: CPT | Performed by: AUDIOLOGIST

## 2019-11-15 PROCEDURE — 92550 TYMPANOMETRY & REFLEX THRESH: CPT | Performed by: AUDIOLOGIST

## 2019-11-15 RX ORDER — TRIAMCINOLONE ACETONIDE 1 MG/G
CREAM TOPICAL 2 TIMES DAILY
Qty: 30 G | Refills: 1 | Status: SHIPPED | OUTPATIENT
Start: 2019-11-15 | End: 2023-07-24

## 2019-11-15 ASSESSMENT — MIFFLIN-ST. JEOR: SCORE: 2149

## 2019-11-15 NOTE — PROGRESS NOTES
AUDIOLOGY REPORT    SUBJECTIVE:  Noris Small is a 54 year old male who was seen at [facility] for audiologic evaluation, referred by SHIRLEY Tyler, C.N.P.  The patient reports a decrease in hearing worse in noisy situations over a number of years. The patient he needs repetition in meets, wife seems to mumble, difficulty following conversation in groups, gradual decrease over years. 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 Reported brief infrequent episodes of tinnitus. Denied vertigo. They were unaccompanied today.    OBJECTIVE:  Otoscopic exam indicates ears are clear of cerumen bilaterally.     Pure Tone Thresholds assessed using conventional audiometry with good  reliability from 250-8000 Hz bilaterally using insert earphones     RIGHT:  normal sloping to minimal to moderate high frequency sensorineural hearing loss from 4000 Hz to 8000 Hz    LEFT:    normal sloping to minimal to moderately severe sensorineural hearing loss 3000 Hz to 8000 Hz    Tympanogram:    RIGHT: normal eardrum mobility    LEFT:   normal eardrum mobility    Reflexes (reported by stimulus ear):  RIGHT: Ipsilateral is present at normal levels  RIGHT: Contralateral is present at normal levels  LEFT:   Ipsilateral is present at normal levels  LEFT:   Contralateral is present at normal levels      Speech Reception Threshold:    RIGHT: 10 dB HL    LEFT:   5 dB HL  Word Recognition Score:     RIGHT: 100% at 50 dB HL using NU-6 recorded word list.    LEFT:   100% at 45 dB HL using NU-6 recorded word list.      ASSESSMENT:     ICD-10-CM    1. Sensorineural hearing loss, bilateral H90.3 COMPREHENSIVE HEARING TEST     TYMPANOMETRY AND REFLEX THRESHOLD MEASUREMENTS     Today s results were discussed with the patient in detail.     PLAN:  Discussed left ear worse than right ear and advised and ENT consult could be scheduled, otherwise return in one year to  audiology. Please call this clinic with questions regarding these results or recommendations.        Christine Kim Licensed Audiologist #[id number]

## 2019-11-15 NOTE — PROGRESS NOTES
"Subjective     Noris Small is a 54 year old male who presents to clinic today for the following health issues:    HPI   Rash on arms, legs and between shoulders started on Saturday  Itches  Using benadryl    Patient presents today for evaluation of a rash. He reports he was in on 11/8/2019 and given the flu shot at that time. Felt a little \"punky\" for a few days but nothing concerning. He reports on Saturday he noticed a rash. This was mainly concentrated on his arms and between his shoulder blades. Reports raised, red and itchy. Has been taking Benadryl which does seem to help a bit. He reports no known exposures. Does travel for work and was gone all last week. No one else at home with similar symptoms. Not aware of any new products.     Patient Active Problem List   Diagnosis     Hyperhidrosis, focal, secondary     Hyperlipidemia LDL goal <130     Morbid obesity (H)     LUIS ALFREDO (obstructive sleep apnea)     Hx of recurrent herpes simplex ophthalmicus     Benign essential hypertension     Dupuytren's contracture of right hand     Primary osteoarthritis involving multiple joints     Past Surgical History:   Procedure Laterality Date     JOINT REPLACEMTN, KNEE RT/LT       ROTATOR CUFF REPAIR RT/LT Right        Social History     Tobacco Use     Smoking status: Never Smoker     Smokeless tobacco: Never Used   Substance Use Topics     Alcohol use: Yes     Comment: occ     Family History   Problem Relation Age of Onset     Polycystic Kidney Diease Mother      Diabetes Father      Thyroid Disease Father      Diabetes Brother          Current Outpatient Medications   Medication Sig Dispense Refill     ADVIL OR 1 CAPSULE EVERY 4 TO 6 HOURS AS NEEDED       ALEVE OR 1 TABLET EVERY 12 HOURS AS NEEDED       clindamycin (CLEOCIN) 300 MG capsule Take 600 mg orally 30-60 minutes prior to dental procedure 2 capsule 1     hyoscyamine ER (LEVBID) 375 mcg 12 hr tablet TAKE ONE TABLET BY MOUTH EVERY 12 HOURS AS NEEDED FOR CRAMPING 30 " "tablet 5     triamcinolone (KENALOG) 0.1 % external cream Apply topically 2 times daily 30 g 1     valACYclovir (VALTREX) 1000 mg tablet Take 2 tablets by mouth at the first sign of symptoms then in 12 hours take another 2 tablets. 30 tablet 3     Allergies   Allergen Reactions     Penicillins Hives       Reviewed and updated as needed this visit by Provider       Review of Systems   ROS COMP: Constitutional, HEENT, cardiovascular, pulmonary, gi and gu systems are negative, except as otherwise noted.      Objective    /68   Pulse 62   Temp 97.7  F (36.5  C) (Temporal)   Resp 16   Ht 1.778 m (5' 10\")   Wt 129.3 kg (285 lb)   SpO2 95%   BMI 40.89 kg/m    Body mass index is 40.89 kg/m .  Physical Exam   GENERAL: healthy, alert and no distress  SKIN: fine, erythematous rash present on arms and mid-back, excoriations present  PSYCH: mentation appears normal, affect normal/bright    Diagnostic Test Results:  Labs reviewed in Epic  none         Assessment & Plan     1. Rash  Rash consistent with dermatitis. Suspect no correlation with the flu shot. Will have patient use triamcinolone cream as needed over affected areas. Discussed keeping skin well hydrated. Patient will follow-up in clinic if new symptoms develop or current symptoms fail to improve.  - triamcinolone (KENALOG) 0.1 % external cream; Apply topically 2 times daily  Dispense: 30 g; Refill: 1     The patient indicates understanding of these issues and agrees with the plan.    Radha Corado PA-C  [facility]    "

## 2020-05-26 ENCOUNTER — TRANSFERRED RECORDS (OUTPATIENT)
Dept: HEALTH INFORMATION MANAGEMENT | Facility: CLINIC | Age: 55
End: 2020-05-26

## 2020-07-13 ENCOUNTER — TELEPHONE (OUTPATIENT)
Dept: SLEEP MEDICINE | Facility: CLINIC | Age: 55
End: 2020-07-13

## 2020-07-13 NOTE — TELEPHONE ENCOUNTER
CALLED THE PT AND DISCUSS WHAT WAS GOING ON WITH HIS CPAP MACHINE. THE PT STATED THAT WHEN HE BREATHES ON THE MACHINE IT MAKES A LOUD NOISE. ASKED THE PT IF HAS CHANGED OUT THE FILTER AND CHECK THE TUBING AND WATER CHAMBER. THE PT STATED THAT HE JUST ORDER A NEW WATER CHAMBER. THE PT STATED THAT HE RECEIVED THE WRONG CHAMBER WITH HIS ORDER BUT HAS INSPECTED EVERYTHING ELSE. PT IS WANTING TO SEND IN THE MACHINE AND RECEIVE A LOANER IN THE MEANTIME. PT IS SCHEDULE TO GO INTO THE [facility] LOCATION TODAY 07/13/2020 3PM TO MEET WITH BORA MOORE PT HAS BEEN SCREENED AND IS NEGATIVE.

## 2020-07-13 NOTE — TELEPHONE ENCOUNTER
CALLED PT AND LVM FOR THE PT TO CALL [facility] TO DISCUSS CPAP MACHINE AND THE ISSUES THAT ARE GOING -[phone].

## 2020-09-01 ENCOUNTER — TELEPHONE (OUTPATIENT)
Dept: SLEEP MEDICINE | Facility: CLINIC | Age: 55
End: 2020-09-01

## 2020-09-01 NOTE — TELEPHONE ENCOUNTER
CALLED PT AND LVM FOR THE PT TO CALL [facility] TO DISCUSS WHAT HE IS NEEDING REGARDING HIS CPAP MACHINE AND THE AUTO ON AND OFF FEATURE ON THE CPAP MACHINE. [phone].

## 2020-09-09 ENCOUNTER — DOCUMENTATION ONLY (OUTPATIENT)
Dept: SLEEP MEDICINE | Facility: CLINIC | Age: 55
End: 2020-09-09

## 2020-09-09 DIAGNOSIS — G47.33 OSA (OBSTRUCTIVE SLEEP APNEA): ICD-10-CM

## 2020-09-09 NOTE — PROGRESS NOTES
PT RETURNED LOANER AND WAS ISSUED NEW CPAP MACHINE AT ZERO CHARGE   SN # [id number]  DN # [id number]  ADDED INTO [employer]

## 2020-11-18 DIAGNOSIS — G47.33 OSA (OBSTRUCTIVE SLEEP APNEA): ICD-10-CM

## 2020-11-23 ENCOUNTER — TRANSFERRED RECORDS (OUTPATIENT)
Dept: HEALTH INFORMATION MANAGEMENT | Facility: CLINIC | Age: 55
End: 2020-11-23

## 2020-12-06 ENCOUNTER — HEALTH MAINTENANCE LETTER (OUTPATIENT)
Age: 55
End: 2020-12-06

## 2020-12-07 ENCOUNTER — TRANSFERRED RECORDS (OUTPATIENT)
Dept: HEALTH INFORMATION MANAGEMENT | Facility: CLINIC | Age: 55
End: 2020-12-07

## 2021-01-15 ENCOUNTER — HEALTH MAINTENANCE LETTER (OUTPATIENT)
Age: 56
End: 2021-01-15

## 2021-02-19 ENCOUNTER — TELEPHONE (OUTPATIENT)
Dept: SLEEP MEDICINE | Facility: CLINIC | Age: 56
End: 2021-02-19

## 2021-02-19 NOTE — TELEPHONE ENCOUNTER
Reason for call:  Other   Patient called regarding (reason for call): returning call  Additional comments: [employer] insurance [employer] called with the prior authorization number for this patient. [id number] and it is valid from 2/19/21-2/18/22. It is for CPAP supplies and only supplies are included, machine and humidifier is excluded.    Phone number to reach patient:  Home number on file [phone] (home)    [employer]: [phone]    Best Time:  Any time    Can we leave a detailed message on this number?  Not Applicable    Travel screening: Not Applicable

## 2021-08-24 ENCOUNTER — TRANSFERRED RECORDS (OUTPATIENT)
Dept: HEALTH INFORMATION MANAGEMENT | Facility: CLINIC | Age: 56
End: 2021-08-24

## 2021-09-25 ENCOUNTER — HEALTH MAINTENANCE LETTER (OUTPATIENT)
Age: 56
End: 2021-09-25

## 2021-12-06 DIAGNOSIS — L74.52: ICD-10-CM

## 2021-12-10 RX ORDER — HYOSCYAMINE SULFATE 0.38 MG/1
TABLET, EXTENDED RELEASE ORAL
Qty: 30 TABLET | Refills: 5 | Status: SHIPPED | OUTPATIENT
Start: 2021-12-10

## 2021-12-10 NOTE — TELEPHONE ENCOUNTER
Pending Prescriptions:                       Disp   Refills    hyoscyamine ER (LEVBID) 375 mcg 12 hr tabl*30 tab*5        Sig: TAKE ONE TABLET BY MOUTH EVERY 12 HOURS AS NEEDED FOR           CRAMPING        Routing refill request to provider for review/approval because:  Oral Anticholinergic Agents Failed 12/06/2021 12:06 PM   Protocol Details  Recent (12 mo) or future (30 days) visit with authorizing provider's specialty     Last refilled 2019      ILYA AguirreN, RN, PHN  [facility]  December 10, 2021

## 2022-02-23 DIAGNOSIS — G47.33 OSA (OBSTRUCTIVE SLEEP APNEA): ICD-10-CM

## 2022-02-24 PROBLEM — G47.33 OSA (OBSTRUCTIVE SLEEP APNEA): Chronic | Status: ACTIVE | Noted: 2018-11-06

## 2022-02-24 NOTE — PROGRESS NOTES
My Chart message sent to patient:  Gordon Veliz PA-C received a request to refill your DME (Durable Medical Equipment) supplies.  The provider did go ahead and refill the supplies, but is asking that you call and schedule a follow-up. Our records indicate that your last visit with us was 11/01/2019. Please call us at [phone] to schedule an appointment with.  For further questions or concerns please message us via My Chart or call us at the number provided. Thank you for your time.       Sincerely,       [facility]  Mckenna Figueroa CMA

## 2022-03-12 ENCOUNTER — HEALTH MAINTENANCE LETTER (OUTPATIENT)
Age: 57
End: 2022-03-12

## 2022-05-16 ENCOUNTER — TRANSFERRED RECORDS (OUTPATIENT)
Dept: HEALTH INFORMATION MANAGEMENT | Facility: CLINIC | Age: 57
End: 2022-05-16
Payer: COMMERCIAL

## 2022-08-20 ENCOUNTER — TRANSFERRED RECORDS (OUTPATIENT)
Dept: FAMILY MEDICINE | Facility: OTHER | Age: 57
End: 2022-08-20

## 2023-04-06 DIAGNOSIS — G47.33 OSA (OBSTRUCTIVE SLEEP APNEA): Primary | Chronic | ICD-10-CM

## 2023-06-02 ENCOUNTER — HEALTH MAINTENANCE LETTER (OUTPATIENT)
Age: 58
End: 2023-06-02

## 2023-06-06 ENCOUNTER — TRANSFERRED RECORDS (OUTPATIENT)
Dept: HEALTH INFORMATION MANAGEMENT | Facility: CLINIC | Age: 58
End: 2023-06-06
Payer: COMMERCIAL

## 2023-06-13 ENCOUNTER — TRANSFERRED RECORDS (OUTPATIENT)
Dept: HEALTH INFORMATION MANAGEMENT | Facility: CLINIC | Age: 58
End: 2023-06-13
Payer: COMMERCIAL

## 2023-07-23 ASSESSMENT — SLEEP AND FATIGUE QUESTIONNAIRES
HOW LIKELY ARE YOU TO NOD OFF OR FALL ASLEEP WHILE SITTING INACTIVE IN A PUBLIC PLACE: SLIGHT CHANCE OF DOZING
HOW LIKELY ARE YOU TO NOD OFF OR FALL ASLEEP WHILE WATCHING TV: SLIGHT CHANCE OF DOZING
HOW LIKELY ARE YOU TO NOD OFF OR FALL ASLEEP WHILE LYING DOWN TO REST IN THE AFTERNOON WHEN CIRCUMSTANCES PERMIT: SLIGHT CHANCE OF DOZING
HOW LIKELY ARE YOU TO NOD OFF OR FALL ASLEEP WHILE SITTING AND TALKING TO SOMEONE: WOULD NEVER DOZE
HOW LIKELY ARE YOU TO NOD OFF OR FALL ASLEEP IN A CAR, WHILE STOPPED FOR A FEW MINUTES IN TRAFFIC: WOULD NEVER DOZE
HOW LIKELY ARE YOU TO NOD OFF OR FALL ASLEEP WHILE SITTING AND READING: MODERATE CHANCE OF DOZING
HOW LIKELY ARE YOU TO NOD OFF OR FALL ASLEEP WHILE SITTING QUIETLY AFTER LUNCH WITHOUT ALCOHOL: WOULD NEVER DOZE
HOW LIKELY ARE YOU TO NOD OFF OR FALL ASLEEP WHEN YOU ARE A PASSENGER IN A CAR FOR AN HOUR WITHOUT A BREAK: SLIGHT CHANCE OF DOZING

## 2023-07-24 ENCOUNTER — OFFICE VISIT (OUTPATIENT)
Dept: SLEEP MEDICINE | Facility: CLINIC | Age: 58
End: 2023-07-24
Payer: COMMERCIAL

## 2023-07-24 VITALS
HEART RATE: 70 BPM | HEIGHT: 69 IN | SYSTOLIC BLOOD PRESSURE: 140 MMHG | BODY MASS INDEX: 46.65 KG/M2 | DIASTOLIC BLOOD PRESSURE: 82 MMHG | OXYGEN SATURATION: 97 % | WEIGHT: 315 LBS

## 2023-07-24 DIAGNOSIS — G47.33 OSA (OBSTRUCTIVE SLEEP APNEA): Primary | Chronic | ICD-10-CM

## 2023-07-24 PROCEDURE — 99203 OFFICE O/P NEW LOW 30 MIN: CPT | Performed by: PHYSICIAN ASSISTANT

## 2023-07-24 RX ORDER — MELOXICAM 15 MG/1
15 TABLET ORAL PRN
COMMUNITY
Start: 2023-06-06 | End: 2023-08-22

## 2023-07-24 RX ORDER — AMLODIPINE BESYLATE 2.5 MG/1
2.5 TABLET ORAL DAILY
COMMUNITY
End: 2023-08-22

## 2023-07-24 NOTE — PROGRESS NOTES
Does Noris have a CPAP/Bipap?  Yes       Type of mask: nasal     FV: [facility] [phone]    [email or web address]     [place] Sleep Scale:  6  BMI:51.62  Stop Ban  Neck:45    Outpatient Sleep Medicine Consultation:      Name: Noris Small MRN# [id number]   Age: 58 year old YOB: 1965     Date of Consultation: 2023  Consultation is requested by: No referring provider defined for this encounter. No ref. provider found  Primary care provider: No Ref-Primary, Physician       Reason for Sleep Consult:     Noris Small is sent by No ref. provider found for a sleep consultation regarding mild sleep apnea, on cpap and compliant. He  has a new  CPAP machine and would like the proper settings and a updated order for supplies.    Patient s Reason for visit  Noris Small main reason for visit: Renew rx for CPAP supplies, adjust setting for new machine,  Patient states problem(s) started: .  Noris Small's goals for this visit: Check in, update rx for supplies, adjust new machine           Assessment and Plan:     Summary Sleep Diagnoses:  Mild mali- on APAP 7-12 CWP, compliant and apnea is well managed. Continues to benefit from therapy.     Comorbid Diagnoses:  HTN      Summary Recommendations:  Updated order for supplies given. Continue current treatment.   No orders of the defined types were placed in this encounter.        Summary Counseling:    Sleep Testing Reviewed  Obstructive Sleep Apnea Reviewed  Complications of Untreated Sleep Apnea Reviewed      Medical Decision-making:   Educational materials provided in instructions    Total time spent reviewing medical records, history and physical examination, review of previous testing and interpretation as well as documentation on this date:40 min    CC: No ref. provider found          History of Present Illness:     Past Sleep Evaluations:    SLEEP-WAKE SCHEDULE:     Work/School Days: Patient  goes to school/work: Yes   Usually gets into bed at 9-10pm  Takes patient about Up to 30 min to fall asleep  Has trouble falling asleep Not too often nights per week  Wakes up in the middle of the night 30-60% of nights times.  Wakes up due to    He has trouble falling back asleep 40% times a week.   It usually takes 1-2 hours to get back to sleep  Patient is usually up at 5-6am  Uses alarm: Yes    Weekends/Non-work Days/All Other Days:  Usually gets into bed at 9-930pm   Takes patient about 30 min to fall asleep  Patient is usually up at 5-6am  Uses alarm: Yes    Sleep Need  Patient gets  6-8 hours sleep on average   Patient thinks he needs about 6-8 hours sleep    Noris Small prefers to sleep in this position(s): Back   Patient states they do the following activities in bed: Watch TV;Use phone, computer, or tablet    Naps  Patient takes a purposeful nap Rare.   1/100 days times a week and naps are usually Couple of hours.   But feel groggy after in duration  He feels better after a nap: Yes  He dozes off unintentionally Rare days per week  Patient has had a driving accident or near-miss due to sleepiness/drowsiness: No      SLEEP DISRUPTIONS:    Breathing/Snoring  Patient snores:Yes  Other people complain about his snoring: Yes  Patient has been told he stops breathing in his sleep:Yes  He has issues with the following: Stuffy nose when you wake up    Movement:  Patient gets pain, discomfort, with an urge to move:  No  It happens when he is resting:  No  It happens more at night:  No  Patient has been told he kicks his legs at night:  No     Behaviors in Sleep:  Noris Small has experienced the following behaviors while sleeping: Recurring Nightmares;Sleep-talking;Sleepwalking;Teeth grinding;Night terrors (screaming,yelling or acting afraid but not recalling event)  He has experienced sudden muscle weakness during the day: No      Is there anything else you would like your sleep provider to know:           CAFFEINE AND OTHER SUBSTANCES:    Patient consumes caffeinated beverages per day:  1-2 a day  Last caffeine use is usually: Up to 3pm  List of any prescribed or over the counter stimulants that patient takes:    List of any prescribed or over the counter sleep medication patient takes:    List of previous sleep medications that patient has tried:    Patient drinks alcohol to help them sleep: No  Patient drinks alcohol near bedtime: Yes    Family History:  Patient has a family member been diagnosed with a sleep disorder: No            SCALES:    EPWORTH SLEEPINESS SCALE         7/23/2023     8:54 PM    [place] Sleepiness Scale ( JOHN Tucker  [id number]<br>ESS - [place]/English - Final version - 21 Nov 07 - [facility] [place].)   Sitting and reading Moderate chance of dozing   Watching TV Slight chance of dozing   Sitting, inactive in a public place (e.g. a theatre or a meeting) Slight chance of dozing   As a passenger in a car for an hour without a break Slight chance of dozing   Lying down to rest in the afternoon when circumstances permit Slight chance of dozing   Sitting and talking to someone Would never doze   Sitting quietly after a lunch without alcohol Would never doze   In a car, while stopped for a few minutes in traffic Would never doze   [place] Score (MC) 6   [place] Score (Sleep) 6         INSOMNIA SEVERITY INDEX (LACI)          7/23/2023     8:32 PM   Insomnia Severity Index (LACI)   Difficulty falling asleep 1   Difficulty staying asleep 2   Problems waking up too early 2   How SATISFIED/DISSATISFIED are you with your CURRENT sleep pattern? 2   How NOTICEABLE to others do you think your sleep problem is in terms of impairing the quality of your life? 1   How WORRIED/DISTRESSED are you about your current sleep problem? 2   To what extent do you consider your sleep problem to INTERFERE with your daily functioning (e.g. daytime fatigue, mood, ability to function at work/daily chores, concentration,  "memory, mood, etc.) CURRENTLY? 1   LACI Total Score 11       Guidelines for Scoring/Interpretation:  Total score categories:  0-7 = No clinically significant insomnia   8-14 = Subthreshold insomnia   15-21 = Clinical insomnia (moderate severity)  22-28 = Clinical insomnia (severe)  Used via courtesy of www.[employer].va.gov with permission from Rancho Oquendo PhD., [facility]      STOP BANG         7/23/2023     8:54 PM   STOP BANG Questionnaire (  2008, the American Society of Anesthesiologists, Inc. [employer]   1. Snoring - Do you snore loudly (louder than talking or loud enough to be heard through closed doors)? Yes   2. Tired - Do you often feel tired, fatigued, or sleepy during daytime? No   3. Observed - Has anyone observed you stop breathing during your sleep? No   4. Blood pressure - Do you have or are you being treated for high blood pressure? Yes   5. BMI - BMI more than 35 kg/m2? Yes   6. Age - Age over 50 yr old? Yes   7. Neck circumference - Neck circumference greater than 40 cm? Yes   8. Gender - Gender male? Yes   STOP BANG Score (MC): 5 (High risk of LUIS ALFREDO)         GAD7         No data to display                    CAGE-AID         No data to display                  CAGE-AID reprinted with permission from the [place] Medical Journal, YUAN Reid. and ASHER Jerez, \"Conjoint screening questionnaires for alcohol and drug abuse\" [place] Medical Journal 94: 135-140, 1995.      PATIENT HEALTH QUESTIONNAIRE-9 (PHQ - 9)        11/6/2018     7:20 AM   PHQ-9 ([employer])   1.  Little interest or pleasure in doing things 0   2.  Feeling down, depressed, or hopeless 0   3.  Trouble falling or staying asleep, or sleeping too much 1   4.  Feeling tired or having little energy 0   5.  Poor appetite or overeating 0   6.  Feeling bad about yourself - or that you are a failure or have let yourself or your family down 0   7.  Trouble concentrating on things, such as reading the newspaper or " watching television 0   8.  Moving or speaking so slowly that other people could have noticed. Or the opposite - being so fidgety or restless that you have been moving around a lot more than usual 0   9.  Thoughts that you would be better off dead, or of hurting yourself in some way 0   PHQ-9 Total Score 1   6.  Feeling bad about yourself 0   7.  Trouble concentrating 0   8.  Moving slowly or restless 0   9.  Suicidal or self-harm thoughts 0   1.  Little interest or pleasure in doing things Not at all   2.  Feeling down, depressed, or hopeless Not at all   3.  Trouble falling or staying asleep, or sleeping too much Several days   4.  Feeling tired or having little energy Not at all   5.  Poor appetite or overeating Not at all   6.  Feeling bad about yourself Not at all   7.  Trouble concentrating Not at all   8.  Moving slowly or restless Not at all   9.  Suicidal or self-harm thoughts Not at all   PHQ-9 via [facility] TOTAL SCORE-----> 1 (Minimal depression)   Difficulty at work, home, or with people Not difficult at all       Developed by Buck Rosario, Tiffanie Baumann, Mayur Contreras and colleagues, with an educational hilda from [employer] No permission required to reproduce, translate, display or distribute.        Allergies:    Allergies   Allergen Reactions    Pcn [Penicillins] Hives       Medications:    Current Outpatient Medications   Medication Sig Dispense Refill    ADVIL OR 1 CAPSULE EVERY 4 TO 6 HOURS AS NEEDED      ALEVE OR 1 TABLET EVERY 12 HOURS AS NEEDED      clindamycin (CLEOCIN) 300 MG capsule Take 600 mg orally 30-60 minutes prior to dental procedure 2 capsule 1    hyoscyamine ER (LEVBID) 375 mcg 12 hr tablet TAKE ONE TABLET BY MOUTH EVERY 12 HOURS AS NEEDED FOR CRAMPING 30 tablet 5    triamcinolone (KENALOG) 0.1 % external cream Apply topically 2 times daily 30 g 1    valACYclovir (VALTREX) 1000 mg tablet Take 2 tablets by mouth at the first sign of symptoms then in 12 hours take  another 2 tablets. 30 tablet 3       Problem List:  Patient Active Problem List    Diagnosis Date Noted    LUIS ALFREDO (obstructive sleep apnea) 11/06/2018     Priority: Medium     HST on 3/18/2015 (287#)-AHI 13, KOURTNEY 11, lowest oxygen saturation was 86%      Hx of recurrent herpes simplex ophthalmicus 11/06/2018     Priority: Medium    Benign essential hypertension 11/06/2018     Priority: Medium    Dupuytren's contracture of right hand 11/06/2018     Priority: Medium    Primary osteoarthritis involving multiple joints 11/06/2018     Priority: Medium    Morbid obesity (H) 05/18/2018     Priority: Medium    Hyperhidrosis, focal, secondary 11/12/2017     Priority: Medium    Hyperlipidemia LDL goal <130 11/12/2017     Priority: Medium        Past Medical/Surgical History:  Past Medical History:   Diagnosis Date    History of bilateral knee replacement 11/6/2018    History of repair of right rotator cuff 11/6/2018    Hypertension, benign essential, goal below 140/90 11/12/2017     Past Surgical History:   Procedure Laterality Date    JOINT REPLACEMTN, KNEE RT/LT      ROTATOR CUFF REPAIR RT/LT Right        Social History:  Social History     Socioeconomic History    Marital status:      Spouse name: Not on file    Number of children: Not on file    Years of education: Not on file    Highest education level: Not on file   Occupational History    Not on file   Tobacco Use    Smoking status: Never    Smokeless tobacco: Never   Substance and Sexual Activity    Alcohol use: Yes     Comment: occ    Drug use: No    Sexual activity: Yes     Partners: Female   Other Topics Concern    Parent/sibling w/ CABG, MI or angioplasty before 65F 55M? Not Asked   Social History Narrative    Not on file     Social Determinants of Health     Financial Resource Strain: Not on file   Food Insecurity: Not on file   Transportation Needs: Not on file   Physical Activity: Not on file   Stress: Not on file   Social Connections: Not on file   Intimate  Partner Violence: Not on file   Housing Stability: Not on file       Family History:  Family History   Problem Relation Age of Onset    Polycystic Kidney Diease Mother     Diabetes Father     Thyroid Disease Father     Diabetes Brother        Review of Systems:  A complete review of systems reviewed by me is negative with the exeption of what has been mentioned in the history of present illness.  In the last TWO WEEKS have you experienced any of the following symptoms?  Fevers: No  Night Sweats: No  Weight Gain: No  Pain at Night: No  Double Vision: No  Changes in Vision: No  Difficulty Breathing through Nose: No  Sore Throat in Morning: No  Dry Mouth in the Morning: Yes  Shortness of Breath Lying Flat: No  Shortness of Breath With Activity: No  Awakening with Shortness of Breath: No  Heart Racing at Night: No  Swelling in Feet or Legs: No  Diarrhea at Night: No  Heartburn at Night: No  Urinating More than Once at Night: No  Losing Control of Urine at Night: No  Joint Pains at Night: No  Headaches in Morning: No  Weakness in Arms or Legs: No  Depressed Mood: No  Anxiety: No     Physical Examination:  Vitals: There were no vitals taken for this visit.  BMI= There is no height or weight on file to calculate BMI.           GENERAL APPEARANCE: healthy, alert, no distress, and over weight  EYES: Eyes grossly normal to inspection, PERRL, and conjunctivae and sclerae normal  HENT: nose and mouth without ulcers or lesions and normal cephalic/atraumatic  NECK: no adenopathy, no asymmetry, masses, or scars, and trachea midline and normal to palpation  RESP: lungs clear to auscultation - no rales, rhonchi or wheezes  CV: regular rates and rhythm, normal S1 S2, no S3 or S4, and no murmur, click or rub  MS: extremities normal- no gross deformities noted  PSYCH: mentation appears normal and affect normal/bright  Mallampati Class: I.  Tonsillar Stage: 3  extending beyond pillars.         Data: All pertinent previous laboratory  data reviewed     Recent Labs   Lab Test 11/08/19  0756 11/06/18  0750 02/09/18  1330     --  139   POTASSIUM 4.3  --  4.5   CHLORIDE 110*  --  107   CO2 25  --  24   ANIONGAP 7  --  8   *  --  89   BUN 13  --  16   CR 0.84 0.73 0.86   CALLI 8.7  --  8.9       Recent Labs   Lab Test 11/08/19  0756   WBC 7.4   RBC 5.44   HGB 15.8   HCT 48.4   MCV 89   MCH 29.0   MCHC 32.6   RDW 13.4          No results for input(s): PROTTOTAL, ALBUMIN, BILITOTAL, ALKPHOS, AST, ALT, BILIDIRECT in the last 77888 hours.    TSH (mU/L)   Date Value   11/03/2017 1.18       No results found for: UAMP, UBARB, BENZODIAZEUR, UCANN, UCOC, OPIT, UPCP    No results found for: IRONSAT, UW11370, ROSA MARIA    No results found for: PH, PHARTERIAL, PO2, IV6DPPYELVY, SAT, PCO2, HCO3, BASEEXCESS, LUZ, BEB    @LABRCNTIPR(phv:4,pco2v:4,po2v:4,hco3v:4,marquise:4,o2per:4)@    Echocardiology: No results found for this or any previous visit (from the past 4320 hour(s)).    Chest x-ray: No results found for this or any previous visit from the past 365 days.      Chest CT: No results found for this or any previous visit from the past 365 days.      PFT: Most Recent [facility] Pulmonary Function Testing    No results found for: 20001  No results found for: 20002  No results found for: 20003  No results found for: 20015  No results found for: 20016  No results found for: 20027  No results found for: 20028  No results found for: 20029  No results found for: 20079  No results found for: 20080  No results found for: 20081  No results found for: 20335  No results found for: 20105  No results found for: 20053  No results found for: 20054  No results found for: 20055      Amarjit Pedersen CMA 7/24/2023         Sleep Apnea - Follow-up Visit:    Impression/Plan:     Mild Sleep apnea. He is Tolerating PAP well, benefits from treatment. . Daytime symptoms are stable.   Supplies re ordered    Noris Small will follow up in about 1 year(s).     Forty minutes spent with  patient, all of which were spent face-to-face counseling, consulting, coordinating plan of care.      CC:  No Ref-Primary, Physician,         History of Present Illness:  Chief Complaint   Patient presents with    Sleep Problem     Establish care for CPAP        Noris Small presents for follow-up of their moderate sleep apnea, managed with CPAP.     [facility]ially presented to [facility] for snoring and excessive daytime sleepiness.     HST on 3/18/2015 (287#)-AHI 13, KOURTNEY 11, lowest oxygen saturation was 86%    Established care with [place] in 2017.    Do you use a CPAP Machine at home: Yes  Overall, on a scale of 0-10 how would you rate your CPAP (0 poor, 10 great): 7    What type of mask do you use: Nasal Mask  Is your mask comfortable: Yes  If not, why:      Is your mask leaking: Yes  If yes, where do you feel it: eyes  How many night per week does the mask leak (0-7): 4    Do you notice snoring with mask on: Yes  Do you notice gasping arousals with mask on: No  Are you having significant oral or nasal dryness: No  Is the pressure setting comfortable: Yes  If not, why:      What is your typical bedtime: 10  How long does it take you to go to sleep on PAP therapy: 30  What time do you typically get out of bed for the day: 6  How many hours on average per night are you using PAP therapy: 7  How many hours are you sleeping per night: 7  Do you feel well rested in the morning: Yes      ResMed   CPAP  cmH2O 30 day usage data:  90% of days with > 4 hours of use. 0/30 days with no use.   Average use 403 minutes per day.   95%ile Leak 9.6 L/min.   AHI 0.66 events per hour.     Auto-PAP 7.0 - 12.0 cmH2O 30 day usage data:    90% of days with > 4 hours of use. 0/30 days with no use.   Average use 403 minutes per day.   95%ile Leak 9.6 L/min.   CPAP 95% pressure 11.4 cm.   AHI 0.66 events per hour.        EPWORTH SLEEPINESS SCALE         7/23/2023     8:54 PM    [place] Sleepiness Scale ( M.W. Johns   [id number]<br>ESS - [place]/English - Final version - 21 Nov 07 - [facility] [place].)   Sitting and reading Moderate chance of dozing   Watching TV Slight chance of dozing   Sitting, inactive in a public place (e.g. a theatre or a meeting) Slight chance of dozing   As a passenger in a car for an hour without a break Slight chance of dozing   Lying down to rest in the afternoon when circumstances permit Slight chance of dozing   Sitting and talking to someone Would never doze   Sitting quietly after a lunch without alcohol Would never doze   In a car, while stopped for a few minutes in traffic Would never doze   [place] Score (MC) 6   [place] Score (Sleep) 6       INSOMNIA SEVERITY INDEX (LACI)          7/23/2023     8:32 PM   Insomnia Severity Index (LACI)   Difficulty falling asleep 1   Difficulty staying asleep 2   Problems waking up too early 2   How SATISFIED/DISSATISFIED are you with your CURRENT sleep pattern? 2   How NOTICEABLE to others do you think your sleep problem is in terms of impairing the quality of your life? 1   How WORRIED/DISTRESSED are you about your current sleep problem? 2   To what extent do you consider your sleep problem to INTERFERE with your daily functioning (e.g. daytime fatigue, mood, ability to function at work/daily chores, concentration, memory, mood, etc.) CURRENTLY? 1   LACI Total Score 11       Guidelines for Scoring/Interpretation:  Total score categories:  0-7 = No clinically significant insomnia   8-14 = Subthreshold insomnia   15-21 = Clinical insomnia (moderate severity)  22-28 = Clinical insomnia (severe)  Used via courtesy of www.[facility].va.gov with permission from Rancho Oquendo PhD., [facility]      Past medical/surgical history, family history, social history, medications and allergies were reviewed.        Problem List:  Patient Active Problem List    Diagnosis Date Noted    LUIS ALFREDO (obstructive sleep apnea) 11/06/2018     Priority: Medium     HST on 3/18/2015  "(287#)-AHI 13, KOURTNEY 11, lowest oxygen saturation was 86%      Hx of recurrent herpes simplex ophthalmicus 11/06/2018     Priority: Medium    Benign essential hypertension 11/06/2018     Priority: Medium    Dupuytren's contracture of right hand 11/06/2018     Priority: Medium    Primary osteoarthritis involving multiple joints 11/06/2018     Priority: Medium    Morbid obesity (H) 05/18/2018     Priority: Medium    Hyperhidrosis, focal, secondary 11/12/2017     Priority: Medium    Hyperlipidemia LDL goal <130 11/12/2017     Priority: Medium        Ht 1.75 m (5' 8.9\")   BMI 42.21 kg/m        "

## 2023-07-24 NOTE — NURSING NOTE
Weight management plan: Patient was referred to their PCP to discuss a diet and exercise plan.     Noris to follow up with Primary Care provider regarding elevated blood pressure.

## 2023-07-27 ENCOUNTER — TRANSFERRED RECORDS (OUTPATIENT)
Dept: HEALTH INFORMATION MANAGEMENT | Facility: CLINIC | Age: 58
End: 2023-07-27
Payer: COMMERCIAL

## 2023-08-21 ASSESSMENT — ENCOUNTER SYMPTOMS
EYE PAIN: 0
WEAKNESS: 0
NAUSEA: 0
DYSURIA: 0
DIZZINESS: 0
NERVOUS/ANXIOUS: 0
ARTHRALGIAS: 1
SORE THROAT: 0
ABDOMINAL PAIN: 0
HEARTBURN: 0
CONSTIPATION: 0
CHILLS: 0
FEVER: 0
MYALGIAS: 0
DIARRHEA: 0
PARESTHESIAS: 0
SHORTNESS OF BREATH: 0
HEMATURIA: 0
HEADACHES: 0
HEMATOCHEZIA: 0
JOINT SWELLING: 1
FREQUENCY: 0
COUGH: 0
PALPITATIONS: 0

## 2023-08-22 ENCOUNTER — OFFICE VISIT (OUTPATIENT)
Dept: FAMILY MEDICINE | Facility: CLINIC | Age: 58
End: 2023-08-22
Payer: COMMERCIAL

## 2023-08-22 ENCOUNTER — TELEPHONE (OUTPATIENT)
Dept: FAMILY MEDICINE | Facility: CLINIC | Age: 58
End: 2023-08-22

## 2023-08-22 VITALS
DIASTOLIC BLOOD PRESSURE: 82 MMHG | BODY MASS INDEX: 46.65 KG/M2 | SYSTOLIC BLOOD PRESSURE: 128 MMHG | HEIGHT: 69 IN | TEMPERATURE: 96.7 F | OXYGEN SATURATION: 95 % | HEART RATE: 68 BPM | WEIGHT: 315 LBS | RESPIRATION RATE: 20 BRPM

## 2023-08-22 DIAGNOSIS — R73.09 ELEVATED GLUCOSE: ICD-10-CM

## 2023-08-22 DIAGNOSIS — Z11.4 SCREENING FOR HIV (HUMAN IMMUNODEFICIENCY VIRUS): ICD-10-CM

## 2023-08-22 DIAGNOSIS — N40.1 BENIGN PROSTATIC HYPERPLASIA WITH LOWER URINARY TRACT SYMPTOMS, SYMPTOM DETAILS UNSPECIFIED: ICD-10-CM

## 2023-08-22 DIAGNOSIS — Z11.59 NEED FOR HEPATITIS C SCREENING TEST: ICD-10-CM

## 2023-08-22 DIAGNOSIS — E66.01 MORBID OBESITY (H): Chronic | ICD-10-CM

## 2023-08-22 DIAGNOSIS — Z00.00 ROUTINE GENERAL MEDICAL EXAMINATION AT A HEALTH CARE FACILITY: Primary | ICD-10-CM

## 2023-08-22 DIAGNOSIS — E78.5 HYPERLIPIDEMIA LDL GOAL <130: ICD-10-CM

## 2023-08-22 DIAGNOSIS — I10 BENIGN ESSENTIAL HYPERTENSION: ICD-10-CM

## 2023-08-22 DIAGNOSIS — G89.29 CHRONIC NECK PAIN: ICD-10-CM

## 2023-08-22 DIAGNOSIS — Z12.5 SCREENING FOR PROSTATE CANCER: ICD-10-CM

## 2023-08-22 DIAGNOSIS — M15.0 PRIMARY OSTEOARTHRITIS INVOLVING MULTIPLE JOINTS: ICD-10-CM

## 2023-08-22 DIAGNOSIS — M54.2 CHRONIC NECK PAIN: ICD-10-CM

## 2023-08-22 DIAGNOSIS — G47.33 OSA (OBSTRUCTIVE SLEEP APNEA): Chronic | ICD-10-CM

## 2023-08-22 LAB
ALBUMIN SERPL BCG-MCNC: 4.5 G/DL (ref 3.5–5.2)
ALP SERPL-CCNC: 100 U/L (ref 40–129)
ALT SERPL W P-5'-P-CCNC: 51 U/L (ref 0–70)
ANION GAP SERPL CALCULATED.3IONS-SCNC: 10 MMOL/L (ref 7–15)
AST SERPL W P-5'-P-CCNC: 33 U/L (ref 0–45)
BILIRUB SERPL-MCNC: 0.7 MG/DL
BUN SERPL-MCNC: 14.8 MG/DL (ref 6–20)
CALCIUM SERPL-MCNC: 9.6 MG/DL (ref 8.6–10)
CHLORIDE SERPL-SCNC: 106 MMOL/L (ref 98–107)
CHOLEST SERPL-MCNC: 198 MG/DL
CREAT SERPL-MCNC: 0.66 MG/DL (ref 0.67–1.17)
DEPRECATED HCO3 PLAS-SCNC: 23 MMOL/L (ref 22–29)
ERYTHROCYTE [DISTWIDTH] IN BLOOD BY AUTOMATED COUNT: 13.3 % (ref 10–15)
GFR SERPL CREATININE-BSD FRML MDRD: >90 ML/MIN/1.73M2
GLUCOSE SERPL-MCNC: 116 MG/DL (ref 70–99)
HBA1C MFR BLD: 5.4 %
HCT VFR BLD AUTO: 47.4 % (ref 40–53)
HCV AB SERPL QL IA: NONREACTIVE
HDLC SERPL-MCNC: 41 MG/DL
HGB BLD-MCNC: 16.2 G/DL (ref 13.3–17.7)
HIV 1+2 AB+HIV1 P24 AG SERPL QL IA: NONREACTIVE
LDLC SERPL CALC-MCNC: 133 MG/DL
MCH RBC QN AUTO: 30.7 PG (ref 26.5–33)
MCHC RBC AUTO-ENTMCNC: 34.2 G/DL (ref 31.5–36.5)
MCV RBC AUTO: 90 FL (ref 78–100)
NONHDLC SERPL-MCNC: 157 MG/DL
PLATELET # BLD AUTO: 226 10E3/UL (ref 150–450)
POTASSIUM SERPL-SCNC: 4.3 MMOL/L (ref 3.4–5.3)
PROT SERPL-MCNC: 7.2 G/DL (ref 6.4–8.3)
PSA SERPL DL<=0.01 NG/ML-MCNC: 0.28 NG/ML (ref 0–3.5)
RBC # BLD AUTO: 5.28 10E6/UL (ref 4.4–5.9)
SODIUM SERPL-SCNC: 139 MMOL/L (ref 136–145)
TRIGL SERPL-MCNC: 118 MG/DL
WBC # BLD AUTO: 6.3 10E3/UL (ref 4–11)

## 2023-08-22 PROCEDURE — 87389 HIV-1 AG W/HIV-1&-2 AB AG IA: CPT | Performed by: STUDENT IN AN ORGANIZED HEALTH CARE EDUCATION/TRAINING PROGRAM

## 2023-08-22 PROCEDURE — 99214 OFFICE O/P EST MOD 30 MIN: CPT | Mod: 25 | Performed by: STUDENT IN AN ORGANIZED HEALTH CARE EDUCATION/TRAINING PROGRAM

## 2023-08-22 PROCEDURE — G0103 PSA SCREENING: HCPCS | Performed by: STUDENT IN AN ORGANIZED HEALTH CARE EDUCATION/TRAINING PROGRAM

## 2023-08-22 PROCEDURE — 86803 HEPATITIS C AB TEST: CPT | Performed by: STUDENT IN AN ORGANIZED HEALTH CARE EDUCATION/TRAINING PROGRAM

## 2023-08-22 PROCEDURE — 85027 COMPLETE CBC AUTOMATED: CPT | Performed by: STUDENT IN AN ORGANIZED HEALTH CARE EDUCATION/TRAINING PROGRAM

## 2023-08-22 PROCEDURE — 99386 PREV VISIT NEW AGE 40-64: CPT | Performed by: STUDENT IN AN ORGANIZED HEALTH CARE EDUCATION/TRAINING PROGRAM

## 2023-08-22 PROCEDURE — 83036 HEMOGLOBIN GLYCOSYLATED A1C: CPT | Performed by: STUDENT IN AN ORGANIZED HEALTH CARE EDUCATION/TRAINING PROGRAM

## 2023-08-22 PROCEDURE — 36415 COLL VENOUS BLD VENIPUNCTURE: CPT | Performed by: STUDENT IN AN ORGANIZED HEALTH CARE EDUCATION/TRAINING PROGRAM

## 2023-08-22 PROCEDURE — 80053 COMPREHEN METABOLIC PANEL: CPT | Performed by: STUDENT IN AN ORGANIZED HEALTH CARE EDUCATION/TRAINING PROGRAM

## 2023-08-22 PROCEDURE — 80061 LIPID PANEL: CPT | Performed by: STUDENT IN AN ORGANIZED HEALTH CARE EDUCATION/TRAINING PROGRAM

## 2023-08-22 RX ORDER — AMLODIPINE BESYLATE 2.5 MG/1
2.5 TABLET ORAL DAILY
Qty: 90 TABLET | Refills: 3 | Status: SHIPPED | OUTPATIENT
Start: 2023-08-22 | End: 2024-07-19

## 2023-08-22 RX ORDER — MELOXICAM 15 MG/1
15 TABLET ORAL PRN
Qty: 60 TABLET | Refills: 0 | Status: SHIPPED | OUTPATIENT
Start: 2023-08-22

## 2023-08-22 ASSESSMENT — ENCOUNTER SYMPTOMS
SHORTNESS OF BREATH: 0
DIARRHEA: 0
FEVER: 0
ABDOMINAL PAIN: 0
SORE THROAT: 0
FREQUENCY: 0
PARESTHESIAS: 0
NAUSEA: 0
CHILLS: 0
JOINT SWELLING: 1
PALPITATIONS: 0
EYE PAIN: 0
CONSTIPATION: 0
ARTHRALGIAS: 1
DYSURIA: 0
MYALGIAS: 0
HEMATOCHEZIA: 0
DIZZINESS: 0
WEAKNESS: 0
COUGH: 0
HEADACHES: 0
HEARTBURN: 0
NERVOUS/ANXIOUS: 0
HEMATURIA: 0

## 2023-08-22 ASSESSMENT — PAIN SCALES - GENERAL: PAINLEVEL: NO PAIN (0)

## 2023-08-22 NOTE — TELEPHONE ENCOUNTER
PRIOR AUTHORIZATION DENIED    Medication: SEMAGLUTIDE-WEIGHT MANAGEMENT 0.25 MG/0.5ML SC SOAJ  Insurance Company: [employer] - Phone [phone] Fax [phone]  Denial Date: 8/22/2023  Denial Rational:     Appeal Information:     Patient Notified: No

## 2023-08-22 NOTE — PROGRESS NOTES
SUBJECTIVE:   CC: Noris is an 58 year old who presents for preventative health visit.       8/22/2023     6:57 AM   Additional Questions   Roomed by Sandhya WOLFE         8/22/2023     6:57 AM   Patient Reported Additional Medications   Patient reports taking the following new medications allergy mediction       Healthy Habits:     Getting at least 3 servings of Calcium per day:  NO    Bi-annual eye exam:  Yes    Dental care twice a year:  Yes    Sleep apnea or symptoms of sleep apnea:  Sleep apnea    Diet:  Regular (no restrictions)    Frequency of exercise:  None    Taking medications regularly:  Yes    Medication side effects:  None      Today's PHQ-2 Score:       8/21/2023    12:41 PM   PHQ-2 ( 1999 [employer])   Q1: Little interest or pleasure in doing things 0   Q2: Feeling down, depressed or hopeless 0   PHQ-2 Score 0   Q1: Little interest or pleasure in doing things Not at all   Q2: Feeling down, depressed or hopeless Not at all   PHQ-2 Score 0             Colonoscopy done on this date: 6 years ago (approximately), by this group: [facility], results were normal.         Have you ever done Advance Care Planning? (For example, a Health Directive, POLST, or a discussion with a medical provider or your loved ones about your wishes): No, advance care planning information given to patient to review.  Patient plans to discuss their wishes with loved ones or provider.      Social History     Tobacco Use    Smoking status: Never    Smokeless tobacco: Never   Substance Use Topics    Alcohol use: Yes     Comment: occ             8/21/2023    12:40 PM   Alcohol Use   Prescreen: >3 drinks/day or >7 drinks/week? No       Last PSA:   PSA   Date Value Ref Range Status   11/03/2017 0.63 0 - 4 ug/L Final     Comment:     Assay Method:  Chemiluminescence using Siemens Vista analyzer     Prostate Specific Antigen Screen   Date Value Ref Range Status   08/22/2023 0.28 0.00 - 3.50 ng/mL Final       Reviewed orders with patient. Reviewed  "health maintenance and updated orders accordingly - Yes  Lab work is in process    Reviewed and updated as needed this visit by clinical staff   Tobacco  Allergies  Meds              Reviewed and updated as needed this visit by Provider                 Past Medical History:   Diagnosis Date    History of bilateral knee replacement 11/6/2018    History of repair of right rotator cuff 11/6/2018    Hypertension, benign essential, goal below 140/90 11/12/2017      Past Surgical History:   Procedure Laterality Date    JOINT REPLACEMTN, KNEE RT/LT      ROTATOR CUFF REPAIR RT/LT Right        Review of Systems   Constitutional:  Negative for chills and fever.   HENT:  Negative for congestion, ear pain, hearing loss and sore throat.    Eyes:  Negative for pain and visual disturbance.   Respiratory:  Negative for cough and shortness of breath.    Cardiovascular:  Positive for peripheral edema. Negative for chest pain and palpitations.   Gastrointestinal:  Negative for abdominal pain, constipation, diarrhea, heartburn, hematochezia and nausea.   Genitourinary:  Negative for dysuria, frequency, genital sores, hematuria, impotence, penile discharge and urgency.   Musculoskeletal:  Positive for arthralgias and joint swelling. Negative for myalgias.   Skin:  Negative for rash.   Neurological:  Negative for dizziness, weakness, headaches and paresthesias.   Psychiatric/Behavioral:  Negative for mood changes. The patient is not nervous/anxious.        OBJECTIVE:   /82 (BP Location: Left arm, Patient Position: Chair)   Pulse 68   Temp (!) 96.7  F (35.9  C) (Temporal)   Resp 20   Ht 1.755 m (5' 9.1\")   Wt (!) 155.8 kg (343 lb 8 oz)   SpO2 95%   BMI 50.58 kg/m      Physical Exam  GENERAL: healthy, alert and no distress  EYES: Eyes grossly normal to inspection, PERRL and conjunctivae and sclerae normal  HENT: ear canals and TM's normal with partial obstruction on the left, nose and mouth without ulcers or lesions  NECK: " no adenopathy, no asymmetry, masses, or scars and thyroid normal to palpation  RESP: lungs clear to auscultation - no rales, rhonchi or wheezes  CV: regular rate and rhythm, normal S1 S2, no S3 or S4, no murmur, click or rub, trace lower extremity peripheral edema and peripheral pulses strong  ABDOMEN: soft, nontender, no hepatosplenomegaly, no masses and bowel sounds normal  MS: no gross musculoskeletal defects noted, trace lower extremity edema  SKIN: no suspicious lesions or rashes  NEURO: Normal strength and tone, mentation intact and speech normal  PSYCH: mentation appears normal, affect normal/bright        ASSESSMENT/PLAN:   Noris was seen today for physical.    Diagnoses and all orders for this visit:    Routine general medical examination at a health care facility  Age-appropriate cancer screening and immunizations discussed    Screening for HIV (human immunodeficiency virus)  -     HIV Antigen Antibody Combo; Future  -     HIV Antigen Antibody Combo    Need for hepatitis C screening test  -     Hepatitis C Screen Reflex to HCV RNA Quant and Genotype; Future  -     Hepatitis C Screen Reflex to HCV RNA Quant and Genotype    Benign essential hypertension  Well-controlled with amlodipine.  Small amount of lower extremity edema at times.  Did discuss possible implication of amlodipine contributing.  He will monitor things and see how things go at home as he has done well on amlodipine and would not want to switch medications at this time.  -     amLODIPine (NORVASC) 2.5 MG tablet; Take 1 tablet (2.5 mg) by mouth daily    Hyperlipidemia LDL goal <130  -     Lipid panel reflex to direct LDL Non-fasting; Future  -     Lipid panel reflex to direct LDL Non-fasting    LUIS ALFREDO (obstructive sleep apnea)  Known sleep apnea with CPAP.  Doing well and following with sleep medicine.    Morbid obesity (H)  Importance of diet and lifestyle modifications discussed today.  Patient is interested in Wegovy and possible side effects  and contraindications discussed.  Need for slow taper up and follow-up in 3 months if he plans to move forward with medication.  -     Semaglutide-Weight Management (WEGOVY) 0.25 MG/0.5ML pen; Inject 0.25 mg Subcutaneous once a week    Primary osteoarthritis involving multiple joints    Chronic neck pain  Following with spine. Currently working on PT and meloxicam with improvement.   -     meloxicam (MOBIC) 15 MG tablet; Take 1 tablet (15 mg) by mouth as needed for moderate pain  -     Comprehensive metabolic panel (BMP + Alb, Alk Phos, ALT, AST, Total. Bili, TP); Future  -     CBC with platelets; Future  -     Comprehensive metabolic panel (BMP + Alb, Alk Phos, ALT, AST, Total. Bili, TP)  -     CBC with platelets    Elevated glucose  Patient with previously elevated glucose but normal A1c today.  -     Hemoglobin A1c; Future  -     Semaglutide-Weight Management (WEGOVY) 0.25 MG/0.5ML pen; Inject 0.25 mg Subcutaneous once a week  -     Hemoglobin A1c    Screening for prostate cancer  -     PSA, screen; Future  -     PSA, screen    Benign prostatic hyperplasia with lower urinary tract symptoms, symptom details unspecified    Other orders  -     REVIEW OF HEALTH MAINTENANCE PROTOCOL ORDERS        Patient has been advised of split billing requirements and indicates understanding: Yes      COUNSELING:   Reviewed preventive health counseling, as reflected in patient instructions       Regular exercise       Healthy diet/nutrition       Vision screening       Hearing screening       Immunizations       Aspirin prophylaxis        Alcohol Use        Safe sex practices/STD prevention       Consider Hep C screening for all patients one time for ages 18-79 years       HIV screeninx in teen years, 1x in adult years, and at intervals if high risk       Colorectal cancer screening       Prostate cancer screening        He reports that he has never smoked. He has never used smokeless tobacco.            Mauro Singleton,  MD [facility]

## 2023-08-22 NOTE — LETTER
August 22, 2023      Noris Small  [address] [address] [facility] [address]        To Whom It May Concern:    Noris Small  was seen on 8/22/23. He has a current BMI of 50.58 and would benefit from Wegovy use. Please let me know if further questions.         Sincerely,        Mauro Singleton MD

## 2023-08-23 NOTE — TELEPHONE ENCOUNTER
Medication Appeal Initiation    We have initiated an appeal for the requested medication:  Medication: SEMAGLUTIDE-WEIGHT MANAGEMENT 0.25 MG/0.5ML SC SOAJ  Appeal Start Date:  8/23/2023  Insurance Company: [employer] - Phone [phone] Fax [phone]   Insurance Phone:   Insurance Fax: [phone]  Comments:

## 2023-09-15 ENCOUNTER — TRANSFERRED RECORDS (OUTPATIENT)
Dept: HEALTH INFORMATION MANAGEMENT | Facility: CLINIC | Age: 58
End: 2023-09-15
Payer: COMMERCIAL

## 2023-10-03 ENCOUNTER — TRANSFERRED RECORDS (OUTPATIENT)
Dept: HEALTH INFORMATION MANAGEMENT | Facility: CLINIC | Age: 58
End: 2023-10-03
Payer: COMMERCIAL

## 2023-10-05 ENCOUNTER — LAB (OUTPATIENT)
Dept: LAB | Facility: CLINIC | Age: 58
End: 2023-10-05
Payer: COMMERCIAL

## 2023-10-05 DIAGNOSIS — M25.561 RIGHT KNEE PAIN: Primary | ICD-10-CM

## 2023-10-05 LAB
CRP SERPL-MCNC: 11.68 MG/L
ERYTHROCYTE [SEDIMENTATION RATE] IN BLOOD BY WESTERGREN METHOD: 8 MM/HR (ref 0–20)

## 2023-10-05 PROCEDURE — 36415 COLL VENOUS BLD VENIPUNCTURE: CPT

## 2023-10-05 PROCEDURE — 85652 RBC SED RATE AUTOMATED: CPT

## 2023-10-05 PROCEDURE — 86140 C-REACTIVE PROTEIN: CPT

## 2023-10-09 ENCOUNTER — OFFICE VISIT (OUTPATIENT)
Dept: DERMATOLOGY | Facility: CLINIC | Age: 58
End: 2023-10-09
Payer: COMMERCIAL

## 2023-10-09 DIAGNOSIS — L82.1 SEBORRHEIC KERATOSES: ICD-10-CM

## 2023-10-09 DIAGNOSIS — D18.01 ANGIOMA OF SKIN: ICD-10-CM

## 2023-10-09 DIAGNOSIS — L81.4 LENTIGO: ICD-10-CM

## 2023-10-09 DIAGNOSIS — L57.0 AK (ACTINIC KERATOSIS): Primary | ICD-10-CM

## 2023-10-09 DIAGNOSIS — D23.9 DERMAL NEVUS: ICD-10-CM

## 2023-10-09 PROCEDURE — 99203 OFFICE O/P NEW LOW 30 MIN: CPT | Performed by: DERMATOLOGY

## 2023-10-09 NOTE — LETTER
10/9/2023         RE: Noris Small  [address] [facility] [address]        Dear Colleague,    Thank you for referring your patient, Noris Small, to the [facility]. Please see a copy of my visit note below.    Noris Small is an extremely pleasant 58 year old year old male patient here today for spot son face and arms.   .   Patient states this has been present for  a while.  Patient reports the following symptoms:  scale on face.  .  Patient reports the following previous treatments none.  These treatments did not work.  Patient reports the following modifying factors none.  Associated symptoms: none.  Patient has no other skin complaints today.  Remainder of the HPI, Meds, PMH, Allergies, FH, and SH was reviewed in chart.      Past Medical History:   Diagnosis Date     History of bilateral knee replacement 11/06/2018     History of repair of right rotator cuff 11/06/2018     Hypertension, benign essential, goal below 140/90 11/12/2017       Past Surgical History:   Procedure Laterality Date     JOINT REPLACEMTN, KNEE RT/LT       ROTATOR CUFF REPAIR RT/LT Right         Family History   Problem Relation Age of Onset     Skin Cancer Mother      Polycystic Kidney Diease Mother      Diabetes Father      Thyroid Disease Father      Hypertension Father      Skin Cancer Father      Diabetes Brother        Social History     Socioeconomic History     Marital status:      Spouse name: Not on file     Number of children: Not on file     Years of education: Not on file     Highest education level: Not on file   Occupational History     Not on file   Tobacco Use     Smoking status: Never     Smokeless tobacco: Never   Vaping Use     Vaping Use: Never used   Substance and Sexual Activity     Alcohol use: Yes     Comment: occ     Drug use: No     Sexual activity: Yes     Partners: Female     Birth control/protection: None   Other Topics Concern     Parent/sibling w/ CABG, MI or  angioplasty before 65F 55M? No   Social History Narrative     Not on file     Social Determinants of Health     Financial Resource Strain: Not on file   Food Insecurity: Not on file   Transportation Needs: Not on file   Physical Activity: Not on file   Stress: Not on file   Social Connections: Not on file   Interpersonal Safety: Not on file   Housing Stability: Not on file       Outpatient Encounter Medications as of 10/9/2023   Medication Sig Dispense Refill     ADVIL OR 1 CAPSULE EVERY 4 TO 6 HOURS AS NEEDED (Patient not taking: Reported on 8/22/2023)       ALEVE OR 1 TABLET EVERY 12 HOURS AS NEEDED       amLODIPine (NORVASC) 2.5 MG tablet Take 1 tablet (2.5 mg) by mouth daily 90 tablet 3     clindamycin (CLEOCIN) 300 MG capsule Take 600 mg orally 30-60 minutes prior to dental procedure (Patient not taking: Reported on 8/22/2023) 2 capsule 1     hyoscyamine ER (LEVBID) 375 mcg 12 hr tablet TAKE ONE TABLET BY MOUTH EVERY 12 HOURS AS NEEDED FOR CRAMPING (Patient not taking: Reported on 8/22/2023) 30 tablet 5     meloxicam (MOBIC) 15 MG tablet Take 1 tablet (15 mg) by mouth as needed for moderate pain 60 tablet 0     Semaglutide-Weight Management (WEGOVY) 0.25 MG/0.5ML pen Inject 0.25 mg Subcutaneous once a week 2 mL 0     valACYclovir (VALTREX) 1000 mg tablet Take 2 tablets by mouth at the first sign of symptoms then in 12 hours take another 2 tablets. 30 tablet 3     No facility-administered encounter medications on file as of 10/9/2023.             O:   NAD, WDWN, Alert & Oriented, Mood & Affect wnl, Vitals stable   Here today alone   General appearance normal   Vitals stable   Alert, oriented and in no acute distress     R cheek gritty scaly papule    Stuck on papules and brown macules on trunk and ext   Red papules on trunk  Flesh colored papules on trunk         Eyes: Conjunctivae/lids:Normal     ENT: Lips, buccal mucosa, tongue: normal    MSK:Normal    Cardiovascular: peripheral edema none    Pulm: Breathing  Normal    Neuro/Psych: Orientation:Alert and Orientedx3 ; Mood/Affect:normal       A/P:  1. Seborrheic keratosis, lentigo, angioma, dermal nevus  2. Actinic keratosis   Pathophysiology discussed with pateint   LN2:  Treated with LN2 for 5s for 1-2 cycles. Warned risks of blistering, pain, pigment change, scarring, and incomplete resolution.  Advised patient to return if lesions do not completely resolve.  Wound care sheet given.  It was a pleasure speaking to Noris Small today.  Previous clinic notes and pertinent laboratory tests were reviewed prior to Noris Small's visit.  Signs and Symptoms of skin cancer discussed with patient.  Patient encouraged to perform monthly skin exams.  UV precautions reviewed with patient.  Return to clinic 3 months      Again, thank you for allowing me to participate in the care of your patient.        Sincerely,        Kayden Spaulding MD

## 2023-10-09 NOTE — PATIENT INSTRUCTIONS
WOUND CARE INSTRUCTIONS   FOR CRYOSURGERY   This area treated with liquid nitrogen should form a blister (areas treated may or may not blister-skin may just turn dark and slough off). You do not need to bandage the area unless a blister forms and breaks (which may be a few days). When the blister breaks, begin daily dressing changes as follows:   1) Clean and dry the area with tap water using clean Q-tip or sterile gauze pad.   2) Apply Polysporin ointment or Bacitracin ointment over entire wound. Do NOT use Neosporin ointment.   3) Cover the wound with a band-aid or sterile non-stick gauze pad and micropore paper tape.   REPEAT THESE INSTRUCTIONS AT LEAST ONCE A DAY UNTIL THE WOUND HAS COMPLETELY HEALED.   It is an old wives tale that a wound heals better when it is exposed to air and allowed to dry out. The wound will heal faster with a better cosmetic result if it is kept moist with ointment and covered with a bandage.   *Do not let the wound dry out.   Supplies Needed:   *Cotton tipped applicators (Q-tips)   *Polysporin ointment or Bacitracin ointment (NOT NEOSPORIN)   *Band-aids, or non stick gauze pads and micropore paper tape   PATIENT INFORMATION   During the healing process you will notice a number of changes. All wounds develop a small halo of redness surrounding the wound. This means healing is occurring. Severe itching with extensive redness usually indicates sensitivity to the ointment or bandage tape used to dress the wound. You should call our office if this develops.   Swelling and/or discoloration around your surgical site is common, particularly when performed around the eye.   All wounds normally drain. The larger the wound the more drainage there will be. After 7-10 days, you will notice the wound beginning to shrink and new skin will begin to grow. The wound is healed when you can see skin has formed over the entire area. A healed wound has a healthy, shiny look to the surface and is red to  dark pink in color to normalize. Wounds may take approximately 4-6 weeks to heal. Larger wounds may take 6-8 weeks. After the wound is healed you may discontinue dressing changes.   You may experience a sensation of tightness as your wound heals. This is normal and will gradually subside.   Your healed wound may be sensitive to temperature changes. This sensitivity improves with time, but if you re having a lot of discomfort, try to avoid temperature extremes.   Patients frequently experience itching after their wound appears to have healed because of the continue healing under the skin. Plain Vaseline will help relieve the itching.        Proper skin care from [facility]:    -Eliminate harsh soaps as they strip the natural oils from the skin, often resulting in dry itchy skin ( i.e. Dial, Zest, Pat Spring)  -Use mild soaps such as Cetaphil or Dove Sensitive Skin in the shower. You do not need to use soap on arms, legs, and trunk every time you shower unless visibly soiled.   -Avoid hot or cold showers.  -After showering, lightly dry off and apply moisturizer within 2-3 minutes. This will help trap moisture in the skin.   -Aggressive use of a moisturizer at least 1-2 times a day to the entire body (including -Vanicream, Cetaphil, Aquaphor or Cerave) and moisturize hands after every washing.  -We recommend using moisturizers that come in a tub that needs to be scooped out, not a pump. This has more of an oil base. It will hold moisture in your skin much better than a water base moisturizer. The above recommended are non-pore clogging.      Wear a sunscreen with at least SPF 30 on your face, ears, neck and V of the chest daily. Wear sunscreen on other areas of the body if those areas are exposed to the sun throughout the day. Sunscreens can contain physical and/or chemical blockers. Physical blockers are less likely to clog pores, these include zinc oxide and titanium dioxide. Reapply every two hour and  after swimming.     Sunscreen examples: https://www.ewg.org/sunscreen/    UV radiation  UVA radiation remains constant throughout the day and throughout the year. It is a longer wavelength than UVB and therefore penetrates deeper into the skin leading to immediate and delayed tanning, photoaging, and skin cancer. 70-80% of UVA and UVB radiation occurs between the hours of 10am-2pm.  UVB radiation  UVB radiation causes the most harmful effects and is more significant during the summer months. However, snow and ice can reflect UVB radiation leading to skin damage during the winter months as well. UVB radiation is responsible for tanning, burning, inflammation, delayed erythema (pinkness), pigmentation (brown spots), and skin cancer.     I recommend self monthly full body exams and yearly full body exams with a dermatology provider. If you develop a new or changing lesion please follow up for examination. Most skin cancers are pink and scaly or pink and pearly. However, we do see blue/brown/black skin cancers.  Consider the ABCDEs of melanoma when giving yourself your monthly full body exam ( don't forget the groin, buttocks, feet, toes, etc). A-asymmetry, B-borders, C-color, D-diameter, E-elevation or evolving. If you see any of these changes please follow up in clinic. If you cannot see your back I recommend purchasing a hand held mirror to use with a larger wall mirror.

## 2023-10-09 NOTE — PROGRESS NOTES
Noris Small is an extremely pleasant 58 year old year old male patient here today for spot son face and arms.   .   Patient states this has been present for  a while.  Patient reports the following symptoms:  scale on face.  .  Patient reports the following previous treatments none.  These treatments did not work.  Patient reports the following modifying factors none.  Associated symptoms: none.  Patient has no other skin complaints today.  Remainder of the HPI, Meds, PMH, Allergies, FH, and SH was reviewed in chart.      Past Medical History:   Diagnosis Date    History of bilateral knee replacement 11/06/2018    History of repair of right rotator cuff 11/06/2018    Hypertension, benign essential, goal below 140/90 11/12/2017       Past Surgical History:   Procedure Laterality Date    JOINT REPLACEMTN, KNEE RT/LT      ROTATOR CUFF REPAIR RT/LT Right         Family History   Problem Relation Age of Onset    Skin Cancer Mother     Polycystic Kidney Diease Mother     Diabetes Father     Thyroid Disease Father     Hypertension Father     Skin Cancer Father     Diabetes Brother        Social History     Socioeconomic History    Marital status:      Spouse name: Not on file    Number of children: Not on file    Years of education: Not on file    Highest education level: Not on file   Occupational History    Not on file   Tobacco Use    Smoking status: Never    Smokeless tobacco: Never   Vaping Use    Vaping Use: Never used   Substance and Sexual Activity    Alcohol use: Yes     Comment: occ    Drug use: No    Sexual activity: Yes     Partners: Female     Birth control/protection: None   Other Topics Concern    Parent/sibling w/ CABG, MI or angioplasty before 65F 55M? No   Social History Narrative    Not on file     Social Determinants of Health     Financial Resource Strain: Not on file   Food Insecurity: Not on file   Transportation Needs: Not on file   Physical Activity: Not on file   Stress: Not on file    Social Connections: Not on file   Interpersonal Safety: Not on file   Housing Stability: Not on file       Outpatient Encounter Medications as of 10/9/2023   Medication Sig Dispense Refill    ADVIL OR 1 CAPSULE EVERY 4 TO 6 HOURS AS NEEDED (Patient not taking: Reported on 8/22/2023)      ALEVE OR 1 TABLET EVERY 12 HOURS AS NEEDED      amLODIPine (NORVASC) 2.5 MG tablet Take 1 tablet (2.5 mg) by mouth daily 90 tablet 3    clindamycin (CLEOCIN) 300 MG capsule Take 600 mg orally 30-60 minutes prior to dental procedure (Patient not taking: Reported on 8/22/2023) 2 capsule 1    hyoscyamine ER (LEVBID) 375 mcg 12 hr tablet TAKE ONE TABLET BY MOUTH EVERY 12 HOURS AS NEEDED FOR CRAMPING (Patient not taking: Reported on 8/22/2023) 30 tablet 5    meloxicam (MOBIC) 15 MG tablet Take 1 tablet (15 mg) by mouth as needed for moderate pain 60 tablet 0    Semaglutide-Weight Management (WEGOVY) 0.25 MG/0.5ML pen Inject 0.25 mg Subcutaneous once a week 2 mL 0    valACYclovir (VALTREX) 1000 mg tablet Take 2 tablets by mouth at the first sign of symptoms then in 12 hours take another 2 tablets. 30 tablet 3     No facility-administered encounter medications on file as of 10/9/2023.             O:   NAD, WDWN, Alert & Oriented, Mood & Affect wnl, Vitals stable   Here today alone   General appearance normal   Vitals stable   Alert, oriented and in no acute distress     R cheek gritty scaly papule    Stuck on papules and brown macules on trunk and ext   Red papules on trunk  Flesh colored papules on trunk         Eyes: Conjunctivae/lids:Normal     ENT: Lips, buccal mucosa, tongue: normal    MSK:Normal    Cardiovascular: peripheral edema none    Pulm: Breathing Normal    Neuro/Psych: Orientation:Alert and Orientedx3 ; Mood/Affect:normal       A/P:  1. Seborrheic keratosis, lentigo, angioma, dermal nevus  2. Actinic keratosis   Pathophysiology discussed with pateint   LN2:  Treated with LN2 for 5s for 1-2 cycles. Warned risks of  blistering, pain, pigment change, scarring, and incomplete resolution.  Advised patient to return if lesions do not completely resolve.  Wound care sheet given.  It was a pleasure speaking to Noris Small today.  Previous clinic notes and pertinent laboratory tests were reviewed prior to Noris Small's visit.  Signs and Symptoms of skin cancer discussed with patient.  Patient encouraged to perform monthly skin exams.  UV precautions reviewed with patient.  Return to clinic 3 months

## 2023-10-27 ENCOUNTER — MYC MEDICAL ADVICE (OUTPATIENT)
Dept: FAMILY MEDICINE | Facility: CLINIC | Age: 58
End: 2023-10-27
Payer: COMMERCIAL

## 2023-10-27 ENCOUNTER — TRANSFERRED RECORDS (OUTPATIENT)
Dept: HEALTH INFORMATION MANAGEMENT | Facility: CLINIC | Age: 58
End: 2023-10-27
Payer: COMMERCIAL

## 2023-10-27 NOTE — TELEPHONE ENCOUNTER
MEDICATION APPEAL APPROVED    Medication: SEMAGLUTIDE-WEIGHT MANAGEMENT 0.25 MG/0.5ML SC SOAJ  Authorization Effective Date: 7/23/2023  Authorization Expiration Date:    Approved Dose/Quantity:   Reference #:     [employer] Company: [employer] - Phone [phone] Fax [phone]   Expected CoPay: $       CoPay Card Available:    Financial Assistance Needed:   Filling Pharmacy:  [facility] - [place], [place] - [address] [place] [address]     Comments:

## 2023-11-07 ENCOUNTER — MYC REFILL (OUTPATIENT)
Dept: FAMILY MEDICINE | Facility: CLINIC | Age: 58
End: 2023-11-07
Payer: COMMERCIAL

## 2023-11-07 DIAGNOSIS — R73.09 ELEVATED GLUCOSE: ICD-10-CM

## 2023-11-07 DIAGNOSIS — E66.01 MORBID OBESITY (H): Chronic | ICD-10-CM

## 2023-12-21 ENCOUNTER — OFFICE VISIT (OUTPATIENT)
Dept: FAMILY MEDICINE | Facility: CLINIC | Age: 58
End: 2023-12-21
Payer: COMMERCIAL

## 2023-12-21 VITALS
BODY MASS INDEX: 46.65 KG/M2 | WEIGHT: 315 LBS | RESPIRATION RATE: 20 BRPM | DIASTOLIC BLOOD PRESSURE: 84 MMHG | SYSTOLIC BLOOD PRESSURE: 126 MMHG | HEART RATE: 67 BPM | HEIGHT: 69 IN | OXYGEN SATURATION: 97 % | TEMPERATURE: 97.1 F

## 2023-12-21 DIAGNOSIS — E66.01 MORBID OBESITY (H): Primary | Chronic | ICD-10-CM

## 2023-12-21 DIAGNOSIS — G47.33 OSA (OBSTRUCTIVE SLEEP APNEA): Chronic | ICD-10-CM

## 2023-12-21 DIAGNOSIS — R73.09 ELEVATED GLUCOSE: ICD-10-CM

## 2023-12-21 DIAGNOSIS — Z96.653 HX OF TOTAL KNEE REPLACEMENT, BILATERAL: ICD-10-CM

## 2023-12-21 DIAGNOSIS — Z12.11 SCREEN FOR COLON CANCER: ICD-10-CM

## 2023-12-21 DIAGNOSIS — I10 BENIGN ESSENTIAL HYPERTENSION: Chronic | ICD-10-CM

## 2023-12-21 DIAGNOSIS — M15.0 PRIMARY OSTEOARTHRITIS INVOLVING MULTIPLE JOINTS: ICD-10-CM

## 2023-12-21 PROCEDURE — 99213 OFFICE O/P EST LOW 20 MIN: CPT | Performed by: STUDENT IN AN ORGANIZED HEALTH CARE EDUCATION/TRAINING PROGRAM

## 2023-12-21 ASSESSMENT — PAIN SCALES - GENERAL: PAINLEVEL: NO PAIN (1)

## 2023-12-21 NOTE — PROGRESS NOTES
Assessment & Plan   Problem List Items Addressed This Visit          Respiratory    LUIS ALFREDO (obstructive sleep apnea) (Chronic)       Digestive    Morbid obesity (H) - Primary (Chronic)    Relevant Medications    Semaglutide-Weight Management (WEGOVY) 0.5 MG/0.5ML pen (Start on 1/21/2024)    Semaglutide-Weight Management (WEGOVY) 1 MG/0.5ML pen (Start on 2/21/2024)    Semaglutide-Weight Management (WEGOVY) 0.25 MG/0.5ML pen       Circulatory    Benign essential hypertension (Chronic)       Musculoskeletal and Integumentary    Primary osteoarthritis involving multiple joints       Other    Hx of total knee replacement, bilateral     Other Visit Diagnoses       Screen for colon cancer        Relevant Orders    Colonoscopy Screening  Referral    Elevated glucose        Relevant Medications    Semaglutide-Weight Management (WEGOVY) 0.25 MG/0.5ML pen           Refill of Wegovy prescription sent now with plan to taper up when available.  I also provided a paper copy if we can find at outside pharmacy.  We also discussed other weight loss options as well as bariatric follow-up.  For now he would like to focus on diet and lifestyle changes and wait for Wegovy to come in stock which I think is the best option.  Otherwise feeling well and would let me know if new or worsening issues pop up.    Mauro Singleton MD  [facility]    Mac Hamm is a 58 year old, presenting for the following health issues:  Recheck Medication (WEGOVY)        12/21/2023     7:16 AM   Additional Questions   Roomed by Haja weber       History of Present Illness       Reason for visit:  Follow up regarding use of Wygovy    He eats 0-1 servings of fruits and vegetables daily.He consumes 0 sweetened beverage(s) daily.He exercises with enough effort to increase his heart rate 9 or less minutes per day.  He exercises with enough effort to increase his heart rate 3 or less days per week.   He is taking medications  "regularly.       Medication Followup of Wegovy   Taking Medication as prescribed: Was only on this medication for 1 month because its hard to get with pharmacies  Side Effects:  None  Medication Helping Symptoms:  yes    Did extremely well with wegovy and lost 20 lbs in one month without side effects. Felt like he was able to stay focused more on lifestyle changes with that. Is having issues with his knees and working with ortho making cardio difficult.     Review of Systems   Constitutional, HEENT, cardiovascular, pulmonary, gi and gu systems are negative, except as otherwise noted.      Objective    /84   Pulse 67   Temp 97.1  F (36.2  C) (Temporal)   Resp 20   Ht 1.753 m (5' 9\")   Wt 147.1 kg (324 lb 3 oz)   SpO2 97%   BMI 47.87 kg/m    Body mass index is 47.87 kg/m .  Physical Exam   GENERAL: healthy, alert and no distress  EYES: Eyes grossly normal to inspection, PERRL and conjunctivae and sclerae normal  RESP: Breathing comfortably room air  CV:  no edema  MS: no gross musculoskeletal defects noted, no edema  SKIN: no suspicious lesions or rashes  NEURO: mentation intact and speech normal  PSYCH: mentation appears normal, affect normal/bright            "

## 2023-12-28 ENCOUNTER — TELEPHONE (OUTPATIENT)
Dept: GASTROENTEROLOGY | Facility: CLINIC | Age: 58
End: 2023-12-28
Payer: COMMERCIAL

## 2023-12-28 NOTE — TELEPHONE ENCOUNTER
"Endoscopy Scheduling Screen    Have you had a positive Covid test in the last 14 days?  No    Are you active on MyChart?   Yes    What insurance is in the chart?  Other:  BCBS    Ordering/Referring Provider:   SANDIP RAY        (If ordering provider performs procedure, schedule with ordering provider unless otherwise instructed. )    BMI: Estimated body mass index is 47.87 kg/m  as calculated from the following:    Height as of 12/21/23: 1.753 m (5' 9\").    Weight as of 12/21/23: 147.1 kg (324 lb 3 oz).     Sedation Ordered  moderate sedation.   If patient BMI > 50 do not schedule in ASC.    If patient BMI > 45 do not schedule at [facility].    Are you taking methadone or Suboxone?  No    Are you taking any prescription medications for pain 3 or more times per week?   NO - No RN review required.    Do you have a history of malignant hyperthermia or adverse reaction to anesthesia?  No    (Females) Are you currently pregnant?   No     Have you been diagnosed or told you have pulmonary hypertension?   No    Do you have an LVAD?  No    Have you been told you have moderate to severe sleep apnea?  Yes (RN Review required for scheduling unless scheduling in Hospital.)    Have you been told you have COPD, asthma, or any other lung disease?  No    Do you have any heart conditions?  No     Have you ever had an organ transplant?   No    Have you ever had or are you awaiting a heart or lung transplant?   No    Have you had a stroke or transient ischemic attack (TIA aka \"mini stroke\" in the last 6 months?   No    Have you been diagnosed with or been told you have cirrhosis of the liver?   No    Are you currently on dialysis?   No    Do you need assistance transferring?   No    BMI: Estimated body mass index is 47.87 kg/m  as calculated from the following:    Height as of 12/21/23: 1.753 m (5' 9\").    Weight as of 12/21/23: 147.1 kg (324 lb 3 oz).     Is patients BMI > 40 and scheduling location [facility]?  No    Do you take an " injectable medication for weight loss or diabetes (excluding insulin)?  Yes, hold time can be up to 7 days. Please check with you prescribing provider for recommendation.     Do you take the medication Naltrexone?  No    Do you take blood thinners?  No       Prep   Are you currently on dialysis or do you have chronic kidney disease?  No    Do you have a diagnosis of diabetes?  No    Do you have a diagnosis of cystic fibrosis (CF)?  No    On a regular basis do you go 3 -5 days between bowel movements?  No    BMI > 40?  Yes (Extended Prep)    Preferred Pharmacy:        [facility] [place] - [address] [place] [address]  [address] [place] [address] [place] [address]  Phone: [phone] Fax: [phone]      Final Scheduling Details   Colonoscopy prep sent?  Golytely Extended Prep    Procedure scheduled  Colonoscopy    Surgeon:  Jaclyn     Date of procedure:  2/1     Pre-OP / PAC:   No - Not required for this site.    Location  PH - Patient preference.    Sedation   MAC/Deep Sedation  site      Patient Reminders:   You will receive a call from a Nurse to review instructions and health history.  This assessment must be completed prior to your procedure.  Failure to complete the Nurse assessment may result in the procedure being cancelled.      On the day of your procedure, please designate an adult(s) who can drive you home stay with you for the next 24 hours. The medicines used in the exam will make you sleepy. You will not be able to drive.      You cannot take public transportation, ride share services, or non-medical taxi service without a responsible caregiver.  Medical transport services are allowed with the requirement that a responsible caregiver will receive you at your destination.  We require that drivers and caregivers are confirmed prior to your procedure.

## 2024-01-02 NOTE — TELEPHONE ENCOUNTER
Caller: Noris Small   Reason for Reschedule/Cancellation (please be detailed, any staff messages or encounters to note?):     PER PT -- CHANGE DATE       Prior to reschedule please review:  Ordering Provider:    Mauro Singleton MD       Sedation per order:MODERATE   Does patient have any ASC Exclusions, please identify?: N      Notes on Cancelled Procedure:  Procedure:Lower Endoscopy [Colonoscopy]   Date: 02/01  Location:[facility]; [address] , [place] [address]  Surgeon: ADOLFO         Rescheduled: YES   Procedure: Lower Endoscopy [Colonoscopy]  Date: 02/08/2024  Location:[facility]; [address] , [place] [address]  Surgeon: ADOLFO   Sedation Level Scheduled  MAC          Reason for Sedation Level PER ORDER   Prep/Instructions updated and sent: ROSSI        Send In - basket message to Panc - Mikael Pool if [facility] procedure is canceled or rescheduled: [ N/A, YES or NO] N/A

## 2024-01-12 ENCOUNTER — TRANSFERRED RECORDS (OUTPATIENT)
Dept: HEALTH INFORMATION MANAGEMENT | Facility: CLINIC | Age: 59
End: 2024-01-12
Payer: COMMERCIAL

## 2024-01-19 ENCOUNTER — TRANSFERRED RECORDS (OUTPATIENT)
Dept: HEALTH INFORMATION MANAGEMENT | Facility: CLINIC | Age: 59
End: 2024-01-19
Payer: COMMERCIAL

## 2024-01-24 DIAGNOSIS — E66.01 MORBID OBESITY (H): Primary | Chronic | ICD-10-CM

## 2024-01-24 NOTE — TELEPHONE ENCOUNTER
Medication Question or Refill    Contacts         Type Contact Phone/Fax    01/24/2024 08:33 AM CST Phone (Incoming) Noris Small (Self) [phone] (H)     Requesting a refill for the next dose of medication - Wygovy 1.7            What medication are you calling about (include dose and sig)?: Semaglutide-Weight Management (WEGOVY) 1.7 MG    Preferred Pharmacy:   [facility] - [place] - [address] [place] [address]  [address] [place] [address] [place] [address]  Phone: [phone] Fax: [phone]      Controlled Substance Agreement on file:   CSA -- Patient Level:    CSA: None found at the patient level.       Who prescribed the medication?: Mauro Singleton MD     Do you need a refill? Yes    When did you use the medication last? 1/19/24    Patient offered an appointment? No    Do you have any questions or concerns?  No      Could we send this information to you in [facility] or would you prefer to receive a phone call?:   Patient would prefer a phone call   Okay to leave a detailed message?: Yes at Cell number on file:    Telephone Information:   Mobile [phone]

## 2024-02-07 ENCOUNTER — ANESTHESIA EVENT (OUTPATIENT)
Dept: GASTROENTEROLOGY | Facility: CLINIC | Age: 59
End: 2024-02-07
Payer: COMMERCIAL

## 2024-02-08 ENCOUNTER — ANESTHESIA (OUTPATIENT)
Dept: GASTROENTEROLOGY | Facility: CLINIC | Age: 59
End: 2024-02-08
Payer: COMMERCIAL

## 2024-02-08 ENCOUNTER — HOSPITAL ENCOUNTER (OUTPATIENT)
Facility: CLINIC | Age: 59
Discharge: HOME | End: 2024-02-08
Attending: SURGERY | Admitting: SURGERY
Payer: COMMERCIAL

## 2024-02-08 VITALS
OXYGEN SATURATION: 95 % | TEMPERATURE: 97.6 F | DIASTOLIC BLOOD PRESSURE: 87 MMHG | SYSTOLIC BLOOD PRESSURE: 135 MMHG | HEART RATE: 73 BPM

## 2024-02-08 LAB — COLONOSCOPY: NORMAL

## 2024-02-08 PROCEDURE — 258N000003 HC RX IP 258 OP 636: Performed by: NURSE ANESTHETIST, CERTIFIED REGISTERED

## 2024-02-08 PROCEDURE — 250N000011 HC RX IP 250 OP 636: Performed by: NURSE ANESTHETIST, CERTIFIED REGISTERED

## 2024-02-08 PROCEDURE — 250N000009 HC RX 250: Performed by: NURSE ANESTHETIST, CERTIFIED REGISTERED

## 2024-02-08 PROCEDURE — 45378 DIAGNOSTIC COLONOSCOPY: CPT | Performed by: SURGERY

## 2024-02-08 PROCEDURE — 250N000009 HC RX 250: Performed by: SURGERY

## 2024-02-08 PROCEDURE — G0105 COLORECTAL SCRN; HI RISK IND: HCPCS | Performed by: SURGERY

## 2024-02-08 PROCEDURE — 370N000017 HC ANESTHESIA TECHNICAL FEE, PER MIN: Performed by: SURGERY

## 2024-02-08 RX ORDER — ONDANSETRON 4 MG/1
4 TABLET, ORALLY DISINTEGRATING ORAL EVERY 30 MIN PRN
Status: DISCONTINUED | OUTPATIENT
Start: 2024-02-08 | End: 2024-02-08 | Stop reason: HOSPADM

## 2024-02-08 RX ORDER — SODIUM CHLORIDE, SODIUM LACTATE, POTASSIUM CHLORIDE, CALCIUM CHLORIDE 600; 310; 30; 20 MG/100ML; MG/100ML; MG/100ML; MG/100ML
INJECTION, SOLUTION INTRAVENOUS CONTINUOUS PRN
Status: DISCONTINUED | OUTPATIENT
Start: 2024-02-08 | End: 2024-02-08

## 2024-02-08 RX ORDER — ONDANSETRON 2 MG/ML
4 INJECTION INTRAMUSCULAR; INTRAVENOUS
Status: DISCONTINUED | OUTPATIENT
Start: 2024-02-08 | End: 2024-02-08 | Stop reason: HOSPADM

## 2024-02-08 RX ORDER — LIDOCAINE 40 MG/G
CREAM TOPICAL
Status: DISCONTINUED | OUTPATIENT
Start: 2024-02-08 | End: 2024-02-08 | Stop reason: HOSPADM

## 2024-02-08 RX ORDER — ONDANSETRON 4 MG/1
4 TABLET, ORALLY DISINTEGRATING ORAL EVERY 6 HOURS PRN
Status: DISCONTINUED | OUTPATIENT
Start: 2024-02-08 | End: 2024-02-08 | Stop reason: HOSPADM

## 2024-02-08 RX ORDER — NALOXONE HYDROCHLORIDE 0.4 MG/ML
0.4 INJECTION, SOLUTION INTRAMUSCULAR; INTRAVENOUS; SUBCUTANEOUS
Status: DISCONTINUED | OUTPATIENT
Start: 2024-02-08 | End: 2024-02-08 | Stop reason: HOSPADM

## 2024-02-08 RX ORDER — PROPOFOL 10 MG/ML
INJECTION, EMULSION INTRAVENOUS PRN
Status: DISCONTINUED | OUTPATIENT
Start: 2024-02-08 | End: 2024-02-08

## 2024-02-08 RX ORDER — NALOXONE HYDROCHLORIDE 0.4 MG/ML
0.2 INJECTION, SOLUTION INTRAMUSCULAR; INTRAVENOUS; SUBCUTANEOUS
Status: DISCONTINUED | OUTPATIENT
Start: 2024-02-08 | End: 2024-02-08 | Stop reason: HOSPADM

## 2024-02-08 RX ORDER — PROCHLORPERAZINE MALEATE 5 MG
10 TABLET ORAL EVERY 6 HOURS PRN
Status: DISCONTINUED | OUTPATIENT
Start: 2024-02-08 | End: 2024-02-08 | Stop reason: HOSPADM

## 2024-02-08 RX ORDER — FLUMAZENIL 0.1 MG/ML
0.2 INJECTION, SOLUTION INTRAVENOUS
Status: DISCONTINUED | OUTPATIENT
Start: 2024-02-08 | End: 2024-02-08 | Stop reason: HOSPADM

## 2024-02-08 RX ORDER — ONDANSETRON 2 MG/ML
4 INJECTION INTRAMUSCULAR; INTRAVENOUS EVERY 30 MIN PRN
Status: DISCONTINUED | OUTPATIENT
Start: 2024-02-08 | End: 2024-02-08 | Stop reason: HOSPADM

## 2024-02-08 RX ORDER — ONDANSETRON 2 MG/ML
4 INJECTION INTRAMUSCULAR; INTRAVENOUS EVERY 6 HOURS PRN
Status: DISCONTINUED | OUTPATIENT
Start: 2024-02-08 | End: 2024-02-08 | Stop reason: HOSPADM

## 2024-02-08 RX ORDER — SODIUM CHLORIDE, SODIUM LACTATE, POTASSIUM CHLORIDE, CALCIUM CHLORIDE 600; 310; 30; 20 MG/100ML; MG/100ML; MG/100ML; MG/100ML
INJECTION, SOLUTION INTRAVENOUS CONTINUOUS
Status: DISCONTINUED | OUTPATIENT
Start: 2024-02-08 | End: 2024-02-08 | Stop reason: HOSPADM

## 2024-02-08 RX ORDER — LIDOCAINE HYDROCHLORIDE 10 MG/ML
INJECTION, SOLUTION INFILTRATION; PERINEURAL PRN
Status: DISCONTINUED | OUTPATIENT
Start: 2024-02-08 | End: 2024-02-08

## 2024-02-08 RX ADMIN — PROPOFOL 200 MCG/KG/MIN: 10 INJECTION, EMULSION INTRAVENOUS at 08:55

## 2024-02-08 RX ADMIN — SODIUM CHLORIDE, POTASSIUM CHLORIDE, SODIUM LACTATE AND CALCIUM CHLORIDE: 600; 310; 30; 20 INJECTION, SOLUTION INTRAVENOUS at 07:50

## 2024-02-08 RX ADMIN — PROPOFOL 100 MG: 10 INJECTION, EMULSION INTRAVENOUS at 08:41

## 2024-02-08 RX ADMIN — SODIUM CHLORIDE, POTASSIUM CHLORIDE, SODIUM LACTATE AND CALCIUM CHLORIDE: 600; 310; 30; 20 INJECTION, SOLUTION INTRAVENOUS at 08:40

## 2024-02-08 RX ADMIN — LIDOCAINE HYDROCHLORIDE 1 ML: 10 INJECTION, SOLUTION EPIDURAL; INFILTRATION; INTRACAUDAL; PERINEURAL at 07:50

## 2024-02-08 RX ADMIN — LIDOCAINE HYDROCHLORIDE 50 MG: 10 INJECTION, SOLUTION INFILTRATION; PERINEURAL at 08:40

## 2024-02-08 ASSESSMENT — ACTIVITIES OF DAILY LIVING (ADL): ADLS_ACUITY_SCORE: 35

## 2024-02-08 NOTE — DISCHARGE INSTRUCTIONS
[facility]    Home Care Following Endoscopy          Activity:  You have just undergone an endoscopic procedure performed with sedation.  Do not work or operate machinery (including a car) for at least 12 hours.      Diet:  Return to the diet you were on before your procedure but eat lightly for the first 12-24 hours.  Drink plenty of water.  Resume any regular medications unless otherwise advised by your physician.  Please begin any new medication prescribed as a result of your procedure as directed by your physician.    Pain:  You may take Tylenol as needed for pain.  Expected Recovery:  You can expect some mild abdominal fullness and/or discomfort due to the air used to inflate your intestinal tract. I encourage you to walk and attempt to pass this air as soon as possible.    Call Your Physician if You Have:  After Colonoscopy:  Worsening persisting abdominal pain which is worse with activity.  Fevers (>101 degrees F), chills or shakes.  Passage of continued blood with bowel movements.     Any questions or concerns about your recovery, please call [phone] or after hours [phone] ([phone]) Nurse Advice Line.

## 2024-02-08 NOTE — ANESTHESIA PREPROCEDURE EVALUATION
Anesthesia Pre-Procedure Evaluation    Patient: Noris Small   MRN: [id number] : 1965        Procedure : Procedure(s):  Colonoscopy          Past Medical History:   Diagnosis Date     History of bilateral knee replacement 2018     History of repair of right rotator cuff 2018     Hypertension, benign essential, goal below 140/90 2017      Past Surgical History:   Procedure Laterality Date     JOINT REPLACEMTN, KNEE RT/LT       ROTATOR CUFF REPAIR RT/LT Right       Allergies   Allergen Reactions     Pcn [Penicillins] Hives      Social History     Tobacco Use     Smoking status: Never     Smokeless tobacco: Never   Substance Use Topics     Alcohol use: Yes     Comment: occ      Wt Readings from Last 1 Encounters:   23 147.1 kg (324 lb 3 oz)        Anesthesia Evaluation   Pt has had prior anesthetic.         ROS/MED HX  ENT/Pulmonary:     (+) sleep apnea,                                       Neurologic:       Cardiovascular:     (+)  hypertension- -   -  - -                                      METS/Exercise Tolerance:     Hematologic:  - neg hematologic  ROS     Musculoskeletal:   (+)  arthritis,             GI/Hepatic:     (+)        bowel prep,            Renal/Genitourinary:     (+)        BPH,      Endo:     (+)               Obesity,       Psychiatric/Substance Use:  - neg psychiatric ROS     Infectious Disease: Comment: HSV      Malignancy:  - neg malignancy ROS     Other:            Physical Exam    Airway  airway exam normal      Mallampati: II   TM distance: > 3 FB   Neck ROM: full   Mouth opening: > 3 cm    Respiratory Devices and Support         Dental  no notable dental history         Cardiovascular   cardiovascular exam normal       Rhythm and rate: regular and normal     Pulmonary   pulmonary exam normal        breath sounds clear to auscultation       OUTSIDE LABS:  CBC:   Lab Results   Component Value Date    WBC 6.3 2023    WBC 7.4 2019    HGB 16.2  "08/22/2023    HGB 15.8 11/08/2019    HCT 47.4 08/22/2023    HCT 48.4 11/08/2019     08/22/2023     11/08/2019     BMP:   Lab Results   Component Value Date     08/22/2023     11/08/2019    POTASSIUM 4.3 08/22/2023    POTASSIUM 4.3 11/08/2019    CHLORIDE 106 08/22/2023    CHLORIDE 110 (H) 11/08/2019    CO2 23 08/22/2023    CO2 25 11/08/2019    BUN 14.8 08/22/2023    BUN 13 11/08/2019    CR 0.66 (L) 08/22/2023    CR 0.84 11/08/2019     (H) 08/22/2023     (H) 11/08/2019     COAGS: No results found for: \"PTT\", \"INR\", \"FIBR\"  POC: No results found for: \"BGM\", \"HCG\", \"HCGS\"  HEPATIC:   Lab Results   Component Value Date    ALBUMIN 4.5 08/22/2023    PROTTOTAL 7.2 08/22/2023    ALT 51 08/22/2023    AST 33 08/22/2023    ALKPHOS 100 08/22/2023    BILITOTAL 0.7 08/22/2023     OTHER:   Lab Results   Component Value Date    A1C 5.4 08/22/2023    CALLI 9.6 08/22/2023    TSH 1.18 11/03/2017    CRP 17.4 (H) 11/08/2019    SED 8 10/05/2023       Anesthesia Plan    ASA Status:  3    NPO Status:  NPO Appropriate    Anesthesia Type: MAC.     - Reason for MAC: straight local not clinically adequate   Induction: Intravenous, Propofol.   Maintenance: TIVA.        Consents    Anesthesia Plan(s) and associated risks, benefits, and realistic alternatives discussed. Questions answered and patient/representative(s) expressed understanding.     - Discussed:     - Discussed with:  Patient      - Extended Intubation/Ventilatory Support Discussed: No.      - Patient is DNR/DNI Status: No     Use of blood products discussed: No .     Postoperative Care    Pain management: Oral pain medications.   PONV prophylaxis: Background Propofol Infusion     Comments:    Other Comments: The risks and benefits of anesthesia, and the alternatives where applicable, have been discussed with the patient, and they wish to proceed.              SHIRLEY Rose CRNA    I have reviewed the pertinent notes and labs in the chart " from the past 30 days and (re)examined the patient.  Any updates or changes from those notes are reflected in this note.

## 2024-02-08 NOTE — H&P
Patient seen for Endoscopy    HPI:  Patient is a 58 year old male w hx polyps here for endoscopy. Not taking blood thinning medications. No MI or CVA history. No issues with previous sedation. No recent acute illness.    Review Of Systems    Skin: negative  Ears/Nose/Throat: negative  Respiratory: No shortness of breath, dyspnea on exertion, cough, or hemoptysis  Cardiovascular: negative  Gastrointestinal: negative  Genitourinary: negative  Musculoskeletal: negative  Neurologic: negative  Hematologic/Lymphatic/Immunologic: negative  Endocrine: negative      Past Medical History:   Diagnosis Date    History of bilateral knee replacement 11/06/2018    History of repair of right rotator cuff 11/06/2018    Hypertension, benign essential, goal below 140/90 11/12/2017       Past Surgical History:   Procedure Laterality Date    JOINT REPLACEMTN, KNEE RT/LT      ROTATOR CUFF REPAIR RT/LT Right        Family History   Problem Relation Age of Onset    Skin Cancer Mother     Polycystic Kidney Diease Mother     Diabetes Father     Thyroid Disease Father     Hypertension Father     Skin Cancer Father     Diabetes Brother        Social History     Socioeconomic History    Marital status:      Spouse name: Not on file    Number of children: Not on file    Years of education: Not on file    Highest education level: Not on file   Occupational History    Not on file   Tobacco Use    Smoking status: Never    Smokeless tobacco: Never   Vaping Use    Vaping Use: Never used   Substance and Sexual Activity    Alcohol use: Yes     Comment: occ    Drug use: No    Sexual activity: Yes     Partners: Female     Birth control/protection: None   Other Topics Concern    Parent/sibling w/ CABG, MI or angioplasty before 65F 55M? No   Social History Narrative    Not on file     Social Determinants of Health     Financial Resource Strain: Low Risk  (12/19/2023)    Financial Resource Strain     Within the past 12 months, have you or your family  members you live with been unable to get utilities (heat, electricity) when it was really needed?: No   Food Insecurity: Low Risk  (12/19/2023)    Food Insecurity     Within the past 12 months, did you worry that your food would run out before you got money to buy more?: No     Within the past 12 months, did the food you bought just not last and you didn t have money to get more?: No   Transportation Needs: Low Risk  (12/19/2023)    Transportation Needs     Within the past 12 months, has lack of transportation kept you from medical appointments, getting your medicines, non-medical meetings or appointments, work, or from getting things that you need?: No   Physical Activity: Not on file   Stress: Not on file   Social Connections: Not on file   Interpersonal Safety: Low Risk  (12/21/2023)    Interpersonal Safety     Do you feel physically and emotionally safe where you currently live?: Yes     Within the past 12 months, have you been hit, slapped, kicked or otherwise physically hurt by someone?: No     Within the past 12 months, have you been humiliated or emotionally abused in other ways by your partner or ex-partner?: No   Housing Stability: Low Risk  (12/19/2023)    Housing Stability     Do you have housing? : Yes     Are you worried about losing your housing?: No       No current outpatient medications on file.       Medications and history reviewed    Physical exam:  Vitals: BP (!) 164/93   Temp 97.6  F (36.4  C) (Temporal)   SpO2 98%   BMI= There is no height or weight on file to calculate BMI.    Constitutional: Healthy, alert, non-distressed   Head: Normo-cephalic, atraumatic, no lesions, masses or tenderness   Cardiovascular: RRR, no new murmurs, +S1, +S2 heart sounds, no clicks, rubs or gallops   Respiratory: CTAB, no rales, rhonchi or wheezing, equal chest rise, good respiratory effort   Gastrointestinal: Soft, non-tender, non distended, no rebound rigidity or guarding, no masses or hernias palpated   :  Deferred  Musculoskeletal: Moves all extremities, normal  strength, no deformities noted   Skin: No suspicious lesions or rashes   Psychiatric: Mentation appears normal, affect appropriate   Hematologic/Lymphatic/Immunologic: Normal cervical and supraclavicular lymph nodes   Patient able to get up on table without difficulty.    Labs show:  No results found for this or any previous visit (from the past 24 hour(s)).    Assessment: Endoscopy  Plan: Pt cleared for anesthesia for proposed procedure.    Hector Anaya, DO

## 2024-02-08 NOTE — ANESTHESIA POSTPROCEDURE EVALUATION
Patient: Noris Small    Procedure: Procedure(s):  Colonoscopy       Anesthesia Type:  MAC    Note:  Disposition: Outpatient   Postop Pain Control: Uneventful            Sign Out: Well controlled pain   PONV: No   Neuro/Psych: Uneventful            Sign Out: Acceptable/Baseline neuro status   Airway/Respiratory: Uneventful            Sign Out: Acceptable/Baseline resp. status   CV/Hemodynamics: Uneventful            Sign Out: Acceptable CV status   Other NRE: NONE   DID A NON-ROUTINE EVENT OCCUR? No    Event details/Postop Comments:  Pt was happy with anesthesia care.  No complications.  I will follow up with the pt if needed.       Last vitals:  Vitals Value Taken Time   /92 02/08/24 0920   Temp     Pulse 77 02/08/24 0920   Resp     SpO2 93 % 02/08/24 0914   Vitals shown include unfiled device data.    Electronically Signed By: SHIRLEY Rose CRNA  February 8, 2024  9:24 AM

## 2024-02-08 NOTE — ANESTHESIA CARE TRANSFER NOTE
Patient: Noris Small    Procedure: Procedure(s):  Colonoscopy       Diagnosis: Screen for colon cancer [Z12.11]  Diagnosis Additional Information: No value filed.    Anesthesia Type:   MAC     Note:    Oropharynx: oropharynx clear of all foreign objects and spontaneously breathing  Level of Consciousness: drowsy  Oxygen Supplementation: room air    Independent Airway: airway patency satisfactory and stable  Dentition: dentition unchanged  Vital Signs Stable: post-procedure vital signs reviewed and stable  Report to RN Given: handoff report given  Patient transferred to: Phase II    Handoff Report: Identifed the Patient, Identified the Reponsible Provider, Reviewed the pertinent medical history, Discussed the surgical course, Reviewed Intra-OP anesthesia mangement and issues during anesthesia, Set expectations for post-procedure period and Allowed opportunity for questions and acknowledgement of understanding  Vitals:  Vitals Value Taken Time   /92 02/08/24 0920   Temp     Pulse 77 02/08/24 0920   Resp     SpO2 93 % 02/08/24 0914   Vitals shown include unfiled device data.    Electronically Signed By: SHIRLEY Rose CRNA  February 8, 2024  9:23 AM

## 2024-02-15 DIAGNOSIS — E66.01 MORBID OBESITY (H): Chronic | ICD-10-CM

## 2024-02-22 RX ORDER — SEMAGLUTIDE 1.7 MG/.75ML
1.7 INJECTION, SOLUTION SUBCUTANEOUS
Qty: 3 ML | Refills: 0 | Status: SHIPPED | OUTPATIENT
Start: 2024-02-22 | End: 2024-02-22

## 2024-03-19 ENCOUNTER — TRANSFERRED RECORDS (OUTPATIENT)
Dept: HEALTH INFORMATION MANAGEMENT | Facility: CLINIC | Age: 59
End: 2024-03-19
Payer: COMMERCIAL

## 2024-03-28 ENCOUNTER — MYC REFILL (OUTPATIENT)
Dept: FAMILY MEDICINE | Facility: CLINIC | Age: 59
End: 2024-03-28
Payer: COMMERCIAL

## 2024-03-28 DIAGNOSIS — E66.01 MORBID OBESITY (H): Chronic | ICD-10-CM

## 2024-03-28 NOTE — TELEPHONE ENCOUNTER
Requested Prescriptions   Pending Prescriptions Disp Refills    Semaglutide-Weight Management (WEGOVY) 2.4 MG/0.75ML pen 3 mL 3     Sig: Inject 2.4 mg Subcutaneous once a week       There is no refill protocol information for this order

## 2024-03-29 ENCOUNTER — TELEPHONE (OUTPATIENT)
Dept: FAMILY MEDICINE | Facility: CLINIC | Age: 59
End: 2024-03-29
Payer: COMMERCIAL

## 2024-03-29 NOTE — TELEPHONE ENCOUNTER
Prior Authorization Retail Medication Request    Medication/Dose: Semaglutide-Weight Management (WEGOVY) 2.4 MG/0.75ML pen  Diagnosis and ICD code (if different than what is on RX):     Morbid obesity (H) [E66.01]         New/renewal/insurance change PA/secondary ins. PA:  Previously Tried and Failed:  na  Rationale:  na    Insurance   Primary: BCBS OUT OF STATE   Insurance ID:  [id number]     Secondary (if applicable):na  Insurance ID:  na    Pharmacy Information (if different than what is on RX)  Name:  [facility]  Phone:  [phone]  Fax:[phone]

## 2024-04-03 ENCOUNTER — VIRTUAL VISIT (OUTPATIENT)
Dept: FAMILY MEDICINE | Facility: CLINIC | Age: 59
End: 2024-04-03
Payer: COMMERCIAL

## 2024-04-03 DIAGNOSIS — I10 BENIGN ESSENTIAL HYPERTENSION: Chronic | ICD-10-CM

## 2024-04-03 DIAGNOSIS — E66.01 MORBID OBESITY (H): Primary | ICD-10-CM

## 2024-04-03 DIAGNOSIS — Z96.653 HX OF TOTAL KNEE REPLACEMENT, BILATERAL: ICD-10-CM

## 2024-04-03 DIAGNOSIS — G47.33 OSA (OBSTRUCTIVE SLEEP APNEA): Chronic | ICD-10-CM

## 2024-04-03 DIAGNOSIS — E78.5 HYPERLIPIDEMIA LDL GOAL <130: Chronic | ICD-10-CM

## 2024-04-03 PROCEDURE — 99213 OFFICE O/P EST LOW 20 MIN: CPT | Mod: 95 | Performed by: STUDENT IN AN ORGANIZED HEALTH CARE EDUCATION/TRAINING PROGRAM

## 2024-04-03 NOTE — PROGRESS NOTES
"    Instructions Relayed to Patient by Virtual Roomer:     Patient is active on [email or web address]:   Relayed following to patient: \"It looks like you are active on [email or web address], are you able to join the visit this way? If not, do you need us to send you a link now or would you like your provider to send a link via text or email when they are ready to initiate the visit?\"    Reminded patient to ensure they were logged on to virtual visit by arrival time listed. Documented in appointment notes if patient had flexibility to initiate visit sooner than arrival time. If pediatric virtual visit, ensured pediatric patient along with parent/guardian will be present for video visit.     Patient offered the website [email or web address] and/or phone number to [email or web address] Help line: [phone]    Noris is a 58 year old who is being evaluated via a billable video visit.    How would you like to obtain your AVS? [employer]  If the video visit is dropped, the invitation should be resent by: Text to cell phone: [phone]  Will anyone else be joining your video visit? No      Assessment & Plan   Problem List Items Addressed This Visit          Respiratory    LUIS ALFREDO (obstructive sleep apnea) (Chronic)       Digestive    Morbid obesity (H) - Primary (Chronic)       Endocrine    Hyperlipidemia LDL goal <130 (Chronic)       Circulatory    Benign essential hypertension (Chronic)       Other    Hx of total knee replacement, bilateral     Patient tolerating medication very well without side effects and clearly benefiting with significant weight reduction with medication in combination of diet and exercise.  Encouraged focus on exercise portion which she has not done as well with in recent months.  Patient notes being down over 50 pounds since starting medication just over 6 months ago and continues to lose weight.  Recently transitioned to 2.4 mg and tolerated well.  Will plan to continue medication now and importance of lifestyle changes " "long-term discussed.  Follow-up with me in 3 months in person with lab at that time.  Sooner if new or worsening issues arise.     BMI  Estimated body mass index is 47.87 kg/m  as calculated from the following:    Height as of 12/21/23: 1.753 m (5' 9\").    Weight as of 12/21/23: 147.1 kg (324 lb 3 oz).   Weight management plan: Discussed healthy diet and exercise guidelines        Mac Hamm is a 58 year old, presenting for the following health issues:  Recheck Medication        4/3/2024     6:43 AM   Additional Questions   Roomed by Rito CHANEY     History of Present Illness       Reason for visit:  RE Wegovy.  Insurance needing a PA for next prescription    He eats 2-3 servings of fruits and vegetables daily.He consumes 0 sweetened beverage(s) daily.He exercises with enough effort to increase his heart rate 9 or less minutes per day.  He exercises with enough effort to increase his heart rate 3 or less days per week.   He is taking medications regularly.       Medication Followup of Wegovy  Taking Medication as prescribed: yes  Side Effects:  Low energy, a little more tired  Medication Helping Symptoms:  yes        Review of Systems  Constitutional, HEENT, cardiovascular, pulmonary, GI, , musculoskeletal, neuro, skin, endocrine and psych systems are negative, except as otherwise noted.      Objective           Vitals:  No vitals were obtained today due to virtual visit.    Physical Exam   GENERAL: alert and no distress  EYES: Eyes grossly normal to inspection.  No discharge or erythema, or obvious scleral/conjunctival abnormalities.  RESP: No audible wheeze, cough, or visible cyanosis.    SKIN: Visible skin clear. No significant rash, abnormal pigmentation or lesions.  NEURO: Cranial nerves grossly intact.  Mentation and speech appropriate for age.  PSYCH: Appropriate affect, tone, and pace of words        Video-Visit Details    Type of service:  Video Visit   Originating Location (pt. Location): " Home    Distant Location (provider location):  On-site  Platform used for Video Visit: Brittany  Signed Electronically by: Mauro Singleton MD

## 2024-04-11 NOTE — TELEPHONE ENCOUNTER
Prior Authorization Approval    Authorization Effective Date: 4/11/2024  Authorization Expiration Date: 10/8/2024  Medication: Semaglutide-Weight Management (WEGOVY) 2.4 MG/0.75ML pen-APPROVED  Reference #:     Insurance Company: Other (see comments)Comment:  [employer]  Which Pharmacy is filling the prescription (Not needed for infusion/clinic administered): [facility] - [place], [place] - [address] [address]  Pharmacy Notified: Yes  Patient Notified: Instructed pharmacy to notify patient when script is ready to /ship.

## 2024-04-11 NOTE — TELEPHONE ENCOUNTER
Retail Pharmacy Prior Authorization Team   Phone: [phone]    PA Initiation    Medication: WEGOVY 2.4 MG/0.75ML SC SOAJ  Insurance Company: Other (see comments)Comment:  [employer]  Pharmacy Filling the Rx: [facility] - [place], [place] - [address]  Filling Pharmacy Phone: [phone]  Filling Pharmacy Fax:    Start Date: 4/11/2024

## 2024-04-16 ENCOUNTER — OFFICE VISIT (OUTPATIENT)
Dept: DERMATOLOGY | Facility: CLINIC | Age: 59
End: 2024-04-16
Payer: COMMERCIAL

## 2024-04-16 DIAGNOSIS — L81.4 LENTIGO: ICD-10-CM

## 2024-04-16 DIAGNOSIS — D18.01 ANGIOMA OF SKIN: ICD-10-CM

## 2024-04-16 DIAGNOSIS — Z87.2 HISTORY OF ACTINIC KERATOSES: ICD-10-CM

## 2024-04-16 DIAGNOSIS — D23.9 DERMAL NEVUS: Primary | ICD-10-CM

## 2024-04-16 DIAGNOSIS — D48.5 NEOPLASM OF UNCERTAIN BEHAVIOR OF SKIN: ICD-10-CM

## 2024-04-16 DIAGNOSIS — L82.1 SEBORRHEIC KERATOSES: ICD-10-CM

## 2024-04-16 PROCEDURE — 88305 TISSUE EXAM BY PATHOLOGIST: CPT | Performed by: DERMATOLOGY

## 2024-04-16 PROCEDURE — 99213 OFFICE O/P EST LOW 20 MIN: CPT | Mod: 25 | Performed by: DERMATOLOGY

## 2024-04-16 PROCEDURE — 11102 TANGNTL BX SKIN SINGLE LES: CPT | Performed by: DERMATOLOGY

## 2024-04-16 NOTE — PATIENT INSTRUCTIONS
Wound Care Instructions     FOR SUPERFICIAL WOUNDS     [facility] [phone]    [facility] [phone]                       AFTER 24 HOURS YOU SHOULD REMOVE THE BANDAGE AND BEGIN DAILY DRESSING CHANGES AS FOLLOWS:     1) Remove Dressing.     2) Clean and dry the area with tap water using a Q-tip or sterile gauze pad.     3) Apply Vaseline, Aquaphor, Polysporin ointment or Bacitracin ointment over entire wound.  Do NOT use Neosporin ointment.     4) Cover the wound with a band-aid, or a sterile non-stick gauze pad and micropore paper tape      REPEAT THESE INSTRUCTIONS AT LEAST ONCE A DAY UNTIL THE WOUND HAS COMPLETELY HEALED.    It is an old wives tale that a wound heals better when it is exposed to air and allowed to dry out. The wound will heal faster with a better cosmetic result if it is kept moist with ointment and covered with a bandage.    **Do not let the wound dry out.**      Supplies Needed:      *Cotton tipped applicators (Q-tips)    *Polysporin Ointment or Bacitracin Ointment (NOT NEOSPORIN)    *Band-aids or non-stick gauze pads and micropore paper tape.      PATIENT INFORMATION:    During the healing process you will notice a number of changes. All wounds develop a small halo of redness surrounding the wound.  This means healing is occurring. Severe itching with extensive redness usually indicates sensitivity to the ointment or bandage tape used to dress the wound.  You should call our office if this develops.      Swelling  and/or discoloration around your surgical site is common, particularly when performed around the eye.    All wounds normally drain.  The larger the wound the more drainage there will be.  After 7-10 days, you will notice the wound beginning to shrink and new skin will begin to grow.  The wound is healed when you can see skin has formed over the entire area.  A healed wound has a healthy, shiny look to the surface and is red to dark pink in color to  normalize.  Wounds may take approximately 4-6 weeks to heal.  Larger wounds may take 6-8 weeks.  After the wound is healed you may discontinue dressing changes.    You may experience a sensation of tightness as your wound heals. This is normal and will gradually subside.    Your healed wound may be sensitive to temperature changes. This sensitivity improves with time, but if you re having a lot of discomfort, try to avoid temperature extremes.    Patients frequently experience itching after their wound appears to have healed because of the continue healing under the skin.  Plain Vaseline will help relieve the itching.        POSSIBLE COMPLICATIONS    BLEEDING:    Leave the bandage in place.  Use tightly rolled up gauze or a cloth to apply direct pressure over the bandage for 30  minutes.  Reapply pressure for an additional 30 minutes if necessary  Use additional gauze and tape to maintain pressure once the bleeding has stopped.

## 2024-04-16 NOTE — LETTER
4/16/2024         RE: Noris Small  [address] [facility] [address]        Dear Colleague,    Thank you for referring your patient, Noris Small, to the [facility]. Please see a copy of my visit note below.    Noris Small is an extremely pleasant 58 year old year old male patient here today for follow up actinic keratosis all clear.  HE notes non healing spot on scalp.  Patient has no other skin complaints today.  Remainder of the HPI, Meds, PMH, Allergies, FH, and SH was reviewed in chart.      Past Medical History:   Diagnosis Date     History of bilateral knee replacement 11/06/2018     History of repair of right rotator cuff 11/06/2018     Hypertension, benign essential, goal below 140/90 11/12/2017       Past Surgical History:   Procedure Laterality Date     COLONOSCOPY N/A 2/8/2024    Procedure: Colonoscopy;  Surgeon: Hector Anaya DO;  Location: [facility]     JOINT REPLACEMTN, KNEE RT/LT       ROTATOR CUFF REPAIR RT/LT Right         Family History   Problem Relation Age of Onset     Skin Cancer Mother      Polycystic Kidney Diease Mother      Diabetes Father      Thyroid Disease Father      Hypertension Father      Skin Cancer Father      Diabetes Brother        Social History     Socioeconomic History     Marital status:      Spouse name: Not on file     Number of children: Not on file     Years of education: Not on file     Highest education level: Not on file   Occupational History     Not on file   Tobacco Use     Smoking status: Never     Smokeless tobacco: Never   Vaping Use     Vaping status: Never Used   Substance and Sexual Activity     Alcohol use: Yes     Comment: occ     Drug use: No     Sexual activity: Yes     Partners: Female     Birth control/protection: None   Other Topics Concern     Parent/sibling w/ CABG, MI or angioplasty before 65F 55M? No   Social History Narrative     Not on file     Social Determinants of Health     Financial  Resource Strain: Low Risk  (12/19/2023)    Financial Resource Strain      Within the past 12 months, have you or your family members you live with been unable to get utilities (heat, electricity) when it was really needed?: No   Food Insecurity: Low Risk  (12/19/2023)    Food Insecurity      Within the past 12 months, did you worry that your food would run out before you got money to buy more?: No      Within the past 12 months, did the food you bought just not last and you didn t have money to get more?: No   Transportation Needs: Low Risk  (12/19/2023)    Transportation Needs      Within the past 12 months, has lack of transportation kept you from medical appointments, getting your medicines, non-medical meetings or appointments, work, or from getting things that you need?: No   Physical Activity: Not on file   Stress: Not on file   Social Connections: Unknown (1/1/2022)    Received from [facility]    Social Connections      Frequency of Communication with Friends and Family: Not on file   Interpersonal Safety: Low Risk  (12/21/2023)    Interpersonal Safety      Do you feel physically and emotionally safe where you currently live?: Yes      Within the past 12 months, have you been hit, slapped, kicked or otherwise physically hurt by someone?: No      Within the past 12 months, have you been humiliated or emotionally abused in other ways by your partner or ex-partner?: No   Housing Stability: Low Risk  (12/19/2023)    Housing Stability      Do you have housing? : Yes      Are you worried about losing your housing?: No       Outpatient Encounter Medications as of 4/16/2024   Medication Sig Dispense Refill     amLODIPine (NORVASC) 2.5 MG tablet Take 1 tablet (2.5 mg) by mouth daily 90 tablet 3     bisacodyl (DULCOLAX) 5 MG EC tablet 2 days prior to procedure, take 2 tablets at 4 pm. 1 day prior to procedure, take 2 tablets at 4 pm. For  additional instructions refer to your colonoscopy prep instructions. 4 tablet 0     meloxicam (MOBIC) 15 MG tablet Take 1 tablet (15 mg) by mouth as needed for moderate pain 60 tablet 0     polyethylene glycol (GOLYTELY) 236 g suspension 2 days prior at 5pm, mix and drink half of a jug of Golytely. Drink an 8 oz. glass of Golytely every 15 minutes until half of the jug is gone. Place remainder of Golytely in the refrigerator. 1 day prior at 5 pm, drink the 2nd half of a jug of Golytely bowel prep. 6 hours before your check-in time, drink an 8 oz. glass of Golytely every 15 minutes until half of the 2nd jug of Golytely is gone. Discard remainder of second jug. 8000 mL 0     Semaglutide-Weight Management (WEGOVY) 2.4 MG/0.75ML pen Inject 2.4 mg Subcutaneous once a week 3 mL 3     ADVIL OR 1 CAPSULE EVERY 4 TO 6 HOURS AS NEEDED (Patient not taking: Reported on 8/22/2023)       ALEVE OR 1 TABLET EVERY 12 HOURS AS NEEDED (Patient not taking: Reported on 4/3/2024)       clindamycin (CLEOCIN) 300 MG capsule Take 600 mg orally 30-60 minutes prior to dental procedure (Patient not taking: Reported on 8/22/2023) 2 capsule 1     hyoscyamine ER (LEVBID) 375 mcg 12 hr tablet TAKE ONE TABLET BY MOUTH EVERY 12 HOURS AS NEEDED FOR CRAMPING (Patient not taking: Reported on 8/22/2023) 30 tablet 5     Semaglutide-Weight Management (WEGOVY) 0.25 MG/0.5ML pen Inject 0.5 mg Subcutaneous once a week (Patient not taking: Reported on 4/3/2024) 2 mL 0     Semaglutide-Weight Management (WEGOVY) 0.5 MG/0.5ML pen Inject 0.5 mg Subcutaneous once a week (Patient not taking: Reported on 4/3/2024) 2 mL 0     Semaglutide-Weight Management (WEGOVY) 1 MG/0.5ML pen Inject 1 mg Subcutaneous once a week (Patient not taking: Reported on 4/3/2024) 2 mL 0     valACYclovir (VALTREX) 1000 mg tablet Take 2 tablets by mouth at the first sign of symptoms then in 12 hours take another 2 tablets. (Patient not taking: Reported on 4/3/2024) 30 tablet 3     No  facility-administered encounter medications on file as of 4/16/2024.             O:   NAD, WDWN, Alert & Oriented, Mood & Affect wnl, Vitals stable   General appearance normal   Vitals stable   Alert, oriented and in no acute distress     R cheek clear of actinic keratosis   L parietal scalp pink papule    Stuck on papules and brown macules on face and scalp   Red papules on scalp   Flesh colored papules on scalp       Eyes: Conjunctivae/lids:Normal     ENT: Lips, buccal mucosa, tongue: normal    MSK:Normal    Cardiovascular: peripheral edema none    Pulm: Breathing Normal    Neuro/Psych: Orientation:Alert and Orientedx3 ; Mood/Affect:normal       A/P:  1. Seborrheic keratosis, lentigo, angioma, dermal nevus, hx of actinic keratosis   2. L parietal scalp r/o irritated nevus  TANGENTIAL BIOPSY SENT OUT:  After consent, anesthesia with LEC and prep, tangential excision performed and specimen sent out for permanent section histology.  No complications and routine wound care. Patient told to call our office in 1-2 weeks for result.       It was a pleasure speaking to Noris Small today.  Previous clinic notes and pertinent laboratory tests were reviewed prior to Noris Small's visit.  Patient encouraged to perform monthly skin exams.  UV precautions reviewed with patient.  Return to clinic pending path       Again, thank you for allowing me to participate in the care of your patient.        Sincerely,        Kayden Spaulding MD

## 2024-04-16 NOTE — PROGRESS NOTES
Noris Small is an extremely pleasant 58 year old year old male patient here today for follow up actinic keratosis all clear.  HE notes non healing spot on scalp.  Patient has no other skin complaints today.  Remainder of the HPI, Meds, PMH, Allergies, FH, and SH was reviewed in chart.      Past Medical History:   Diagnosis Date    History of bilateral knee replacement 11/06/2018    History of repair of right rotator cuff 11/06/2018    Hypertension, benign essential, goal below 140/90 11/12/2017       Past Surgical History:   Procedure Laterality Date    COLONOSCOPY N/A 2/8/2024    Procedure: Colonoscopy;  Surgeon: Hector Anaya DO;  Location: [facility]    JOINT REPLACEMTN, KNEE RT/LT      ROTATOR CUFF REPAIR RT/LT Right         Family History   Problem Relation Age of Onset    Skin Cancer Mother     Polycystic Kidney Diease Mother     Diabetes Father     Thyroid Disease Father     Hypertension Father     Skin Cancer Father     Diabetes Brother        Social History     Socioeconomic History    Marital status:      Spouse name: Not on file    Number of children: Not on file    Years of education: Not on file    Highest education level: Not on file   Occupational History    Not on file   Tobacco Use    Smoking status: Never    Smokeless tobacco: Never   Vaping Use    Vaping status: Never Used   Substance and Sexual Activity    Alcohol use: Yes     Comment: occ    Drug use: No    Sexual activity: Yes     Partners: Female     Birth control/protection: None   Other Topics Concern    Parent/sibling w/ CABG, MI or angioplasty before 65F 55M? No   Social History Narrative    Not on file     Social Determinants of Health     Financial Resource Strain: Low Risk  (12/19/2023)    Financial Resource Strain     Within the past 12 months, have you or your family members you live with been unable to get utilities (heat, electricity) when it was really needed?: No   Food Insecurity: Low Risk  (12/19/2023)    Food  Insecurity     Within the past 12 months, did you worry that your food would run out before you got money to buy more?: No     Within the past 12 months, did the food you bought just not last and you didn t have money to get more?: No   Transportation Needs: Low Risk  (12/19/2023)    Transportation Needs     Within the past 12 months, has lack of transportation kept you from medical appointments, getting your medicines, non-medical meetings or appointments, work, or from getting things that you need?: No   Physical Activity: Not on file   Stress: Not on file   Social Connections: Unknown (1/1/2022)    [facility]    Social Connections     Frequency of Communication with Friends and Family: Not on file   Interpersonal Safety: Low Risk  (12/21/2023)    Interpersonal Safety     Do you feel physically and emotionally safe where you currently live?: Yes     Within the past 12 months, have you been hit, slapped, kicked or otherwise physically hurt by someone?: No     Within the past 12 months, have you been humiliated or emotionally abused in other ways by your partner or ex-partner?: No   Housing Stability: Low Risk  (12/19/2023)    Housing Stability     Do you have housing? : Yes     Are you worried about losing your housing?: No       Outpatient Encounter Medications as of 4/16/2024   Medication Sig Dispense Refill    amLODIPine (NORVASC) 2.5 MG tablet Take 1 tablet (2.5 mg) by mouth daily 90 tablet 3    bisacodyl (DULCOLAX) 5 MG EC tablet 2 days prior to procedure, take 2 tablets at 4 pm. 1 day prior to procedure, take 2 tablets at 4 pm. For additional instructions refer to your colonoscopy prep instructions. 4 tablet 0    meloxicam (MOBIC) 15 MG tablet Take 1 tablet (15 mg) by mouth as needed for moderate pain 60 tablet 0    polyethylene glycol (GOLYTELY) 236 g suspension 2 days prior at 5pm, mix and drink half of a jug of  Golytely. Drink an 8 oz. glass of Golytely every 15 minutes until half of the jug is gone. Place remainder of Golytely in the refrigerator. 1 day prior at 5 pm, drink the 2nd half of a jug of Golytely bowel prep. 6 hours before your check-in time, drink an 8 oz. glass of Golytely every 15 minutes until half of the 2nd jug of Golytely is gone. Discard remainder of second jug. 8000 mL 0    Semaglutide-Weight Management (WEGOVY) 2.4 MG/0.75ML pen Inject 2.4 mg Subcutaneous once a week 3 mL 3    ADVIL OR 1 CAPSULE EVERY 4 TO 6 HOURS AS NEEDED (Patient not taking: Reported on 8/22/2023)      ALEVE OR 1 TABLET EVERY 12 HOURS AS NEEDED (Patient not taking: Reported on 4/3/2024)      clindamycin (CLEOCIN) 300 MG capsule Take 600 mg orally 30-60 minutes prior to dental procedure (Patient not taking: Reported on 8/22/2023) 2 capsule 1    hyoscyamine ER (LEVBID) 375 mcg 12 hr tablet TAKE ONE TABLET BY MOUTH EVERY 12 HOURS AS NEEDED FOR CRAMPING (Patient not taking: Reported on 8/22/2023) 30 tablet 5    Semaglutide-Weight Management (WEGOVY) 0.25 MG/0.5ML pen Inject 0.5 mg Subcutaneous once a week (Patient not taking: Reported on 4/3/2024) 2 mL 0    Semaglutide-Weight Management (WEGOVY) 0.5 MG/0.5ML pen Inject 0.5 mg Subcutaneous once a week (Patient not taking: Reported on 4/3/2024) 2 mL 0    Semaglutide-Weight Management (WEGOVY) 1 MG/0.5ML pen Inject 1 mg Subcutaneous once a week (Patient not taking: Reported on 4/3/2024) 2 mL 0    valACYclovir (VALTREX) 1000 mg tablet Take 2 tablets by mouth at the first sign of symptoms then in 12 hours take another 2 tablets. (Patient not taking: Reported on 4/3/2024) 30 tablet 3     No facility-administered encounter medications on file as of 4/16/2024.             O:   NAD, WDWN, Alert & Oriented, Mood & Affect wnl, Vitals stable   General appearance normal   Vitals stable   Alert, oriented and in no acute distress     R cheek clear of actinic keratosis   L parietal scalp pink papule     Stuck on papules and brown macules on face and scalp   Red papules on scalp   Flesh colored papules on scalp       Eyes: Conjunctivae/lids:Normal     ENT: Lips, buccal mucosa, tongue: normal    MSK:Normal    Cardiovascular: peripheral edema none    Pulm: Breathing Normal    Neuro/Psych: Orientation:Alert and Orientedx3 ; Mood/Affect:normal       A/P:  1. Seborrheic keratosis, lentigo, angioma, dermal nevus, hx of actinic keratosis   2. L parietal scalp r/o irritated nevus  TANGENTIAL BIOPSY SENT OUT:  After consent, anesthesia with LEC and prep, tangential excision performed and specimen sent out for permanent section histology.  No complications and routine wound care. Patient told to call our office in 1-2 weeks for result.       It was a pleasure speaking to Noris Small today.  Previous clinic notes and pertinent laboratory tests were reviewed prior to Noris Small's visit.  Patient encouraged to perform monthly skin exams.  UV precautions reviewed with patient.  Return to clinic pending path

## 2024-04-18 LAB
PATH REPORT.COMMENTS IMP SPEC: NORMAL
PATH REPORT.COMMENTS IMP SPEC: NORMAL
PATH REPORT.FINAL DX SPEC: NORMAL
PATH REPORT.GROSS SPEC: NORMAL
PATH REPORT.MICROSCOPIC SPEC OTHER STN: NORMAL
PATH REPORT.RELEVANT HX SPEC: NORMAL

## 2024-04-19 ENCOUNTER — TELEPHONE (OUTPATIENT)
Dept: DERMATOLOGY | Facility: CLINIC | Age: 59
End: 2024-04-19
Payer: COMMERCIAL

## 2024-04-19 NOTE — TELEPHONE ENCOUNTER
Spoke with patient and reviewed results. Pt verbalized understanding. Appt made for cryo.  Xuan CANCINO RN BSN PHN  Specialty Clinics

## 2024-04-19 NOTE — TELEPHONE ENCOUNTER
----- Message from Dania Burciaga PA-C sent at 4/19/2024  8:21 AM CDT -----  For Dr Spaulding - kostas for the biopsy on the left parietal scalp came back as an actinic keratosis. Recommend cryo for complete resolution.

## 2024-04-19 NOTE — TELEPHONE ENCOUNTER
Patient Contact    Attempt # 1    Was call answered?  No    Left message for pt to call back for results.      [facility]   [phone]

## 2024-04-29 ENCOUNTER — TELEPHONE (OUTPATIENT)
Dept: DERMATOLOGY | Facility: CLINIC | Age: 59
End: 2024-04-29

## 2024-04-29 NOTE — TELEPHONE ENCOUNTER
[facility] Call Center    Phone Message    May a detailed message be left on voicemail: yes     Reason for Call: Other: Patient had to cancel today's appt. Due to his mother being rushed to the hospital. Patient was scheduled in clinic to be seen within a 2 week timeframe for a follow up with . Please call patient back to discuss rescheduling. Thanks.    Action Taken: Other: [facility]    Travel Screening: Not Applicable

## 2024-05-13 ENCOUNTER — OFFICE VISIT (OUTPATIENT)
Dept: DERMATOLOGY | Facility: CLINIC | Age: 59
End: 2024-05-13
Payer: COMMERCIAL

## 2024-05-13 DIAGNOSIS — L57.0 AK (ACTINIC KERATOSIS): Primary | ICD-10-CM

## 2024-05-13 PROCEDURE — 17000 DESTRUCT PREMALG LESION: CPT | Performed by: DERMATOLOGY

## 2024-05-13 NOTE — LETTER
5/13/2024         RE: Noris Small  [address] [address]        Dear Colleague,    Thank you for referring your patient, Noris Small, to the [facility]. Please see a copy of my visit note below.    Noris Small is an extremely pleasant 59 year old year old male patient here today for evaluation and managment of actinic keratosis on scalp.  Patient has no other skin complaints today.  Remainder of the HPI, Meds, PMH, Allergies, FH, and SH was reviewed in chart.      Past Medical History:   Diagnosis Date     History of bilateral knee replacement 11/06/2018     History of repair of right rotator cuff 11/06/2018     Hypertension, benign essential, goal below 140/90 11/12/2017       Past Surgical History:   Procedure Laterality Date     COLONOSCOPY N/A 2/8/2024    Procedure: Colonoscopy;  Surgeon: Hector Anaya DO;  Location:  GI     JOINT REPLACEMTN, KNEE RT/LT       ROTATOR CUFF REPAIR RT/LT Right         Family History   Problem Relation Age of Onset     Skin Cancer Mother      Polycystic Kidney Diease Mother      Diabetes Father      Thyroid Disease Father      Hypertension Father      Skin Cancer Father      Diabetes Brother        Social History     Socioeconomic History     Marital status:      Spouse name: Not on file     Number of children: Not on file     Years of education: Not on file     Highest education level: Not on file   Occupational History     Not on file   Tobacco Use     Smoking status: Never     Smokeless tobacco: Never   Vaping Use     Vaping status: Never Used   Substance and Sexual Activity     Alcohol use: Yes     Comment: occ     Drug use: No     Sexual activity: Yes     Partners: Female     Birth control/protection: None   Other Topics Concern     Parent/sibling w/ CABG, MI or angioplasty before 65F 55M? No   Social History Narrative     Not on file     Social Determinants of Health     Financial Resource Strain: Low Risk   (12/19/2023)    Financial Resource Strain      Within the past 12 months, have you or your family members you live with been unable to get utilities (heat, electricity) when it was really needed?: No   Food Insecurity: Low Risk  (12/19/2023)    Food Insecurity      Within the past 12 months, did you worry that your food would run out before you got money to buy more?: No      Within the past 12 months, did the food you bought just not last and you didn t have money to get more?: No   Transportation Needs: Low Risk  (12/19/2023)    Transportation Needs      Within the past 12 months, has lack of transportation kept you from medical appointments, getting your medicines, non-medical meetings or appointments, work, or from getting things that you need?: No   Physical Activity: Not on file   Stress: Not on file   Social Connections: Unknown (1/1/2022)    Received from [facility], [facility]    Social Connections      Frequency of Communication with Friends and Family: Not on file   Interpersonal Safety: Low Risk  (12/21/2023)    Interpersonal Safety      Do you feel physically and emotionally safe where you currently live?: Yes      Within the past 12 months, have you been hit, slapped, kicked or otherwise physically hurt by someone?: No      Within the past 12 months, have you been humiliated or emotionally abused in other ways by your partner or ex-partner?: No   Housing Stability: Low Risk  (12/19/2023)    Housing Stability      Do you have housing? : Yes      Are you worried about losing your housing?: No       Outpatient Encounter Medications as of 5/13/2024   Medication Sig Dispense Refill     ADVIL OR 1 CAPSULE EVERY 4 TO 6 HOURS AS NEEDED (Patient not taking: Reported on 8/22/2023)       ALEVE OR 1 TABLET EVERY 12 HOURS AS NEEDED (Patient not taking: Reported on 4/3/2024)       amLODIPine (NORVASC) 2.5 MG tablet Take 1 tablet (2.5 mg) by mouth  daily 90 tablet 3     bisacodyl (DULCOLAX) 5 MG EC tablet 2 days prior to procedure, take 2 tablets at 4 pm. 1 day prior to procedure, take 2 tablets at 4 pm. For additional instructions refer to your colonoscopy prep instructions. 4 tablet 0     clindamycin (CLEOCIN) 300 MG capsule Take 600 mg orally 30-60 minutes prior to dental procedure (Patient not taking: Reported on 8/22/2023) 2 capsule 1     hyoscyamine ER (LEVBID) 375 mcg 12 hr tablet TAKE ONE TABLET BY MOUTH EVERY 12 HOURS AS NEEDED FOR CRAMPING (Patient not taking: Reported on 8/22/2023) 30 tablet 5     meloxicam (MOBIC) 15 MG tablet Take 1 tablet (15 mg) by mouth as needed for moderate pain 60 tablet 0     polyethylene glycol (GOLYTELY) 236 g suspension 2 days prior at 5pm, mix and drink half of a jug of Golytely. Drink an 8 oz. glass of Golytely every 15 minutes until half of the jug is gone. Place remainder of Golytely in the refrigerator. 1 day prior at 5 pm, drink the 2nd half of a jug of Golytely bowel prep. 6 hours before your check-in time, drink an 8 oz. glass of Golytely every 15 minutes until half of the 2nd jug of Golytely is gone. Discard remainder of second jug. 8000 mL 0     Semaglutide-Weight Management (WEGOVY) 0.25 MG/0.5ML pen Inject 0.5 mg Subcutaneous once a week (Patient not taking: Reported on 4/3/2024) 2 mL 0     Semaglutide-Weight Management (WEGOVY) 0.5 MG/0.5ML pen Inject 0.5 mg Subcutaneous once a week (Patient not taking: Reported on 4/3/2024) 2 mL 0     Semaglutide-Weight Management (WEGOVY) 1 MG/0.5ML pen Inject 1 mg Subcutaneous once a week (Patient not taking: Reported on 4/3/2024) 2 mL 0     Semaglutide-Weight Management (WEGOVY) 2.4 MG/0.75ML pen Inject 2.4 mg Subcutaneous once a week 3 mL 3     valACYclovir (VALTREX) 1000 mg tablet Take 2 tablets by mouth at the first sign of symptoms then in 12 hours take another 2 tablets. (Patient not taking: Reported on 4/3/2024) 30 tablet 3     No facility-administered encounter  medications on file as of 5/13/2024.             O:   NAD, WDWN, Alert & Oriented, Mood & Affect wnl, Vitals stable   General appearance normal   Vitals stable   Alert, oriented and in no acute distress     L scalp rgitty scaly papule       Eyes: Conjunctivae/lids:Normal     ENT: Lips, mucosa: normal    MSK:Normal    Cardiovascular: peripheral edema none    Pulm: Breathing Normal    Neuro/Psych: Orientation:Alert and Orientedx3 ; Mood/Affect:normal       A/P:  Actinic keratosis   Pathophysiology discussed with pateint   LN2:  Treated with LN2 for 5s for 1-2 cycles. Warned risks of blistering, pain, pigment change, scarring, and incomplete resolution.  Advised patient to return if lesions do not completely resolve.  Wound care sheet given.  It was a pleasure speaking to Noris Small today.  Previous clinic notes and pertinent laboratory tests were reviewed prior to Noris Small's visit.  Signs and Symptoms of skin cancer discussed with patient.  Patient encouraged to perform monthly skin exams.  UV precautions reviewed with patient.  Return to clinic 12 months      Again, thank you for allowing me to participate in the care of your patient.        Sincerely,        Kayden Spaulding MD

## 2024-05-13 NOTE — PATIENT INSTRUCTIONS
WOUND CARE INSTRUCTIONS   FOR CRYOSURGERY   This area treated with liquid nitrogen should form a blister (areas treated may or may not blister-skin may just turn dark and slough off). You do not need to bandage the area unless a blister forms and breaks (which may be a few days). When the blister breaks, begin daily dressing changes as follows:   1) Clean and dry the area with tap water using clean Q-tip or sterile gauze pad.   2) Apply Polysporin ointment or Bacitracin ointment over entire wound. Do NOT use Neosporin ointment.   3) Cover the wound with a band-aid or sterile non-stick gauze pad and micropore paper tape.   REPEAT THESE INSTRUCTIONS AT LEAST ONCE A DAY UNTIL THE WOUND HAS COMPLETELY HEALED.   It is an old wives tale that a wound heals better when it is exposed to air and allowed to dry out. The wound will heal faster with a better cosmetic result if it is kept moist with ointment and covered with a bandage.   *Do not let the wound dry out.   Supplies Needed:   *Cotton tipped applicators (Q-tips)   *Polysporin ointment or Bacitracin ointment (NOT NEOSPORIN)   *Band-aids, or non stick gauze pads and micropore paper tape   PATIENT INFORMATION   During the healing process you will notice a number of changes. All wounds develop a small halo of redness surrounding the wound. This means healing is occurring. Severe itching with extensive redness usually indicates sensitivity to the ointment or bandage tape used to dress the wound. You should call our office if this develops.   Swelling and/or discoloration around your surgical site is common, particularly when performed around the eye.   All wounds normally drain. The larger the wound the more drainage there will be. After 7-10 days, you will notice the wound beginning to shrink and new skin will begin to grow. The wound is healed when you can see skin has formed over the entire area. A healed wound has a healthy, shiny look to the surface and is red to dark  pink in color to normalize. Wounds may take approximately 4-6 weeks to heal. Larger wounds may take 6-8 weeks. After the wound is healed you may discontinue dressing changes.   You may experience a sensation of tightness as your wound heals. This is normal and will gradually subside.   Your healed wound may be sensitive to temperature changes. This sensitivity improves with time, but if you re having a lot of discomfort, try to avoid temperature extremes.   Patients frequently experience itching after their wound appears to have healed because of the continue healing under the skin. Plain Vaseline will help relieve the itching.      Patient Education       Proper skin care from [facility]:    -Eliminate harsh soaps as they strip the natural oils from the skin, often resulting in dry itchy skin ( i.e. Dial, Zest, Pat Spring)  -Use mild soaps such as Cetaphil or Dove Sensitive Skin in the shower. You do not need to use soap on arms, legs, and trunk every time you shower unless visibly soiled.   -Avoid hot or cold showers.  -After showering, lightly dry off and apply moisturizing within 2-3 minutes. This will help trap moisture in the skin.   -Aggressive use of a moisturizer at least 1-2 times a day to the entire body (including -Vanicream, Cetaphil, Aquaphor or Cerave) and moisturize hands after every washing.  -We recommend using moisturizers that come in a tub that needs to be scooped out, not a pump. This has more of an oil base. It will hold moisture in your skin much better than a water base moisturizer. The above recommended are non-pore clogging.      Wear a sunscreen with at least SPF 30 on your face, ears, neck and V of the chest daily. Wear sunscreen on other areas of the body if those areas are exposed to the sun throughout the day. Sunscreens can contain physical and/or chemical blockers. Physical blockers are less likely to clog pores, these include zinc oxide and titanium dioxide. Reapply  every two hour and after swimming.     Sunscreen examples: https://www.ewg.org/sunscreen/    UV radiation  UVA radiation remains constant throughout the day and throughout the year. It is a longer wavelength than UVB and therefore penetrates deeper into the skin leading to immediate and delayed tanning, photoaging, and skin cancer. 70-80% of UVA and UVB radiation occurs between the hours of 10am-2pm.  UVB radiation  UVB radiation causes the most harmful effects and is more significant during the summer months. However, snow and ice can reflect UVB radiation leading to skin damage during the winter months as well. UVB radiation is responsible for tanning, burning, inflammation, delayed erythema (pinkness), pigmentation (brown spots), and skin cancer.     I recommend self monthly full body exams and yearly full body exams with a dermatology provider. If you develop a new or changing lesion please follow up for examination. Most skin cancers are pink and scaly or pink and pearly. However, we do see blue/brown/black skin cancers.  Consider the ABCDEs of melanoma when giving yourself your monthly full body exam ( don't forget the groin, buttocks, feet, toes, etc). A-asymmetry, B-borders, C-color, D-diameter, E-elevation or evolving. If you see any of these changes please follow up in clinic. If you cannot see your back I recommend purchasing a hand held mirror to use with a larger wall mirror.       Checking for Skin Cancer  You can find cancer early by checking your skin each month. There are 3 kinds of skin cancer. They are melanoma, basal cell carcinoma, and squamous cell carcinoma. Doing monthly skin checks is the best way to find new marks or skin changes. Follow the instructions below for checking your skin.   The ABCDEs of checking moles for melanoma   Check your moles or growths for signs of melanoma using ABCDE:   Asymmetry: the sides of the mole or growth don t match  Border: the edges are ragged, notched, or  blurred  Color: the color within the mole or growth varies  Diameter: the mole or growth is larger than 6 mm (size of a pencil eraser)  Evolving: the size, shape, or color of the mole or growth is changing (evolving is not shown in the images below)    Checking for other types of skin cancer  Basal cell carcinoma or squamous cell carcinoma have symptoms such as:     A spot or mole that looks different from all other marks on your skin  Changes in how an area feels, such as itching, tenderness, or pain  Changes in the skin's surface, such as oozing, bleeding, or scaliness  A sore that does not heal  New swelling or redness beyond the border of a mole    Who s at risk?  Anyone can get skin cancer. But you are at greater risk if you have:   Fair skin, light-colored hair, or light-colored eyes  Many moles or abnormal moles on your skin  A history of sunburns from sunlight or tanning beds  A family history of skin cancer  A history of exposure to radiation or chemicals  A weakened immune system  If you have had skin cancer in the past, you are at risk for recurring skin cancer.   How to check your skin  Do your monthly skin checkups in front of a full-length mirror. Check all parts of your body, including your:   Head (ears, face, neck, and scalp)  Torso (front, back, and sides)  Arms (tops, undersides, upper, and lower armpits)  Hands (palms, backs, and fingers, including under the nails)  Buttocks and genitals  Legs (front, back, and sides)  Feet (tops, soles, toes, including under the nails, and between toes)  If you have a lot of moles, take digital photos of them each month. Make sure to take photos both up close and from a distance. These can help you see if any moles change over time.   Most skin changes are not cancer. But if you see any changes in your skin, call your doctor right away. Only he or she can diagnose a problem. If you have skin cancer, seeing your doctor can be the first step toward getting the  treatment that could save your life.   [employer] last reviewed this educational content on 4/1/2019 2000-2020 [employer]. [address] [place] [address], [place] [address]. All rights reserved. This information is not intended as a substitute for professional medical care. Always follow your healthcare professional's instructions.       When should I call my doctor?  If you are worsening or not improving, please, contact us or seek urgent care as noted below.     Who should I call with questions (adults)?  [facility] (adult and pediatric): [phone]  [facility] (adult): [phone]  [facility] ([place] and [place]) [phone]  For urgent needs outside of business hours call the [facility] at [phone] and ask for the dermatology resident on call to be paged  If this is a medical emergency and you are unable to reach an ER, Call [phone]      If you need a prescription refill, please contact your pharmacy. Refills are approved or denied by our Physicians during normal business hours, Monday through Fridays  Per office policy, refills will not be granted if you have not been seen within the past year (or sooner depending on your child's condition)

## 2024-05-13 NOTE — PROGRESS NOTES
Noris Small is an extremely pleasant 59 year old year old male patient here today for evaluation and managment of actinic keratosis on scalp.  Patient has no other skin complaints today.  Remainder of the HPI, Meds, PMH, Allergies, FH, and SH was reviewed in chart.      Past Medical History:   Diagnosis Date    History of bilateral knee replacement 11/06/2018    History of repair of right rotator cuff 11/06/2018    Hypertension, benign essential, goal below 140/90 11/12/2017       Past Surgical History:   Procedure Laterality Date    COLONOSCOPY N/A 2/8/2024    Procedure: Colonoscopy;  Surgeon: Hector Anaya DO;  Location:  GI    JOINT REPLACEMTN, KNEE RT/LT      ROTATOR CUFF REPAIR RT/LT Right         Family History   Problem Relation Age of Onset    Skin Cancer Mother     Polycystic Kidney Diease Mother     Diabetes Father     Thyroid Disease Father     Hypertension Father     Skin Cancer Father     Diabetes Brother        Social History     Socioeconomic History    Marital status:      Spouse name: Not on file    Number of children: Not on file    Years of education: Not on file    Highest education level: Not on file   Occupational History    Not on file   Tobacco Use    Smoking status: Never    Smokeless tobacco: Never   Vaping Use    Vaping status: Never Used   Substance and Sexual Activity    Alcohol use: Yes     Comment: occ    Drug use: No    Sexual activity: Yes     Partners: Female     Birth control/protection: None   Other Topics Concern    Parent/sibling w/ CABG, MI or angioplasty before 65F 55M? No   Social History Narrative    Not on file     Social Determinants of Health     Financial Resource Strain: Low Risk  (12/19/2023)    Financial Resource Strain     Within the past 12 months, have you or your family members you live with been unable to get utilities (heat, electricity) when it was really needed?: No   Food Insecurity: Low Risk  (12/19/2023)    Food Insecurity     Within  the past 12 months, did you worry that your food would run out before you got money to buy more?: No     Within the past 12 months, did the food you bought just not last and you didn t have money to get more?: No   Transportation Needs: Low Risk  (12/19/2023)    Transportation Needs     Within the past 12 months, has lack of transportation kept you from medical appointments, getting your medicines, non-medical meetings or appointments, work, or from getting things that you need?: No   Physical Activity: Not on file   Stress: Not on file   Social Connections: Unknown (1/1/2022)    Received from [facility] [employer] [facility] [employer] [facility]    Social Connections     Frequency of Communication with Friends and Family: Not on file   Interpersonal Safety: Low Risk  (12/21/2023)    Interpersonal Safety     Do you feel physically and emotionally safe where you currently live?: Yes     Within the past 12 months, have you been hit, slapped, kicked or otherwise physically hurt by someone?: No     Within the past 12 months, have you been humiliated or emotionally abused in other ways by your partner or ex-partner?: No   Housing Stability: Low Risk  (12/19/2023)    Housing Stability     Do you have housing? : Yes     Are you worried about losing your housing?: No       Outpatient Encounter Medications as of 5/13/2024   Medication Sig Dispense Refill    ADVIL OR 1 CAPSULE EVERY 4 TO 6 HOURS AS NEEDED (Patient not taking: Reported on 8/22/2023)      ALEVE OR 1 TABLET EVERY 12 HOURS AS NEEDED (Patient not taking: Reported on 4/3/2024)      amLODIPine (NORVASC) 2.5 MG tablet Take 1 tablet (2.5 mg) by mouth daily 90 tablet 3    bisacodyl (DULCOLAX) 5 MG EC tablet 2 days prior to procedure, take 2 tablets at 4 pm. 1 day prior to procedure, take 2 tablets at 4 pm. For additional instructions refer to your colonoscopy prep instructions. 4 tablet 0    clindamycin (CLEOCIN) 300 MG capsule Take  600 mg orally 30-60 minutes prior to dental procedure (Patient not taking: Reported on 8/22/2023) 2 capsule 1    hyoscyamine ER (LEVBID) 375 mcg 12 hr tablet TAKE ONE TABLET BY MOUTH EVERY 12 HOURS AS NEEDED FOR CRAMPING (Patient not taking: Reported on 8/22/2023) 30 tablet 5    meloxicam (MOBIC) 15 MG tablet Take 1 tablet (15 mg) by mouth as needed for moderate pain 60 tablet 0    polyethylene glycol (GOLYTELY) 236 g suspension 2 days prior at 5pm, mix and drink half of a jug of Golytely. Drink an 8 oz. glass of Golytely every 15 minutes until half of the jug is gone. Place remainder of Golytely in the refrigerator. 1 day prior at 5 pm, drink the 2nd half of a jug of Golytely bowel prep. 6 hours before your check-in time, drink an 8 oz. glass of Golytely every 15 minutes until half of the 2nd jug of Golytely is gone. Discard remainder of second jug. 8000 mL 0    Semaglutide-Weight Management (WEGOVY) 0.25 MG/0.5ML pen Inject 0.5 mg Subcutaneous once a week (Patient not taking: Reported on 4/3/2024) 2 mL 0    Semaglutide-Weight Management (WEGOVY) 0.5 MG/0.5ML pen Inject 0.5 mg Subcutaneous once a week (Patient not taking: Reported on 4/3/2024) 2 mL 0    Semaglutide-Weight Management (WEGOVY) 1 MG/0.5ML pen Inject 1 mg Subcutaneous once a week (Patient not taking: Reported on 4/3/2024) 2 mL 0    Semaglutide-Weight Management (WEGOVY) 2.4 MG/0.75ML pen Inject 2.4 mg Subcutaneous once a week 3 mL 3    valACYclovir (VALTREX) 1000 mg tablet Take 2 tablets by mouth at the first sign of symptoms then in 12 hours take another 2 tablets. (Patient not taking: Reported on 4/3/2024) 30 tablet 3     No facility-administered encounter medications on file as of 5/13/2024.             O:   NAD, WDWN, Alert & Oriented, Mood & Affect wnl, Vitals stable   General appearance normal   Vitals stable   Alert, oriented and in no acute distress     L scalp rgitty scaly papule       Eyes: Conjunctivae/lids:Normal     ENT: Lips, mucosa:  normal    MSK:Normal    Cardiovascular: peripheral edema none    Pulm: Breathing Normal    Neuro/Psych: Orientation:Alert and Orientedx3 ; Mood/Affect:normal       A/P:  Actinic keratosis   Pathophysiology discussed with pateint   LN2:  Treated with LN2 for 5s for 1-2 cycles. Warned risks of blistering, pain, pigment change, scarring, and incomplete resolution.  Advised patient to return if lesions do not completely resolve.  Wound care sheet given.  It was a pleasure speaking to Noris Small today.  Previous clinic notes and pertinent laboratory tests were reviewed prior to Noris Small's visit.  Signs and Symptoms of skin cancer discussed with patient.  Patient encouraged to perform monthly skin exams.  UV precautions reviewed with patient.  Return to clinic 12 months

## 2024-07-18 DIAGNOSIS — E66.01 MORBID OBESITY (H): Chronic | ICD-10-CM

## 2024-07-19 DIAGNOSIS — I10 BENIGN ESSENTIAL HYPERTENSION: ICD-10-CM

## 2024-07-19 RX ORDER — AMLODIPINE BESYLATE 2.5 MG/1
2.5 TABLET ORAL DAILY
Qty: 90 TABLET | Refills: 2 | Status: SHIPPED | OUTPATIENT
Start: 2024-07-19 | End: 2024-10-04

## 2024-07-24 ENCOUNTER — MYC REFILL (OUTPATIENT)
Dept: FAMILY MEDICINE | Facility: CLINIC | Age: 59
End: 2024-07-24
Payer: COMMERCIAL

## 2024-07-24 DIAGNOSIS — E66.01 MORBID OBESITY (H): Chronic | ICD-10-CM

## 2024-08-23 ENCOUNTER — TRANSFERRED RECORDS (OUTPATIENT)
Dept: HEALTH INFORMATION MANAGEMENT | Facility: CLINIC | Age: 59
End: 2024-08-23
Payer: COMMERCIAL

## 2024-09-03 DIAGNOSIS — G47.33 OBSTRUCTIVE SLEEP APNEA (ADULT) (PEDIATRIC): Primary | ICD-10-CM

## 2024-10-04 ENCOUNTER — OFFICE VISIT (OUTPATIENT)
Dept: FAMILY MEDICINE | Facility: CLINIC | Age: 59
End: 2024-10-04
Payer: COMMERCIAL

## 2024-10-04 VITALS
TEMPERATURE: 97.5 F | BODY MASS INDEX: 43.32 KG/M2 | SYSTOLIC BLOOD PRESSURE: 129 MMHG | DIASTOLIC BLOOD PRESSURE: 83 MMHG | HEART RATE: 75 BPM | HEIGHT: 69 IN | RESPIRATION RATE: 15 BRPM | OXYGEN SATURATION: 97 % | WEIGHT: 292.5 LBS

## 2024-10-04 DIAGNOSIS — N40.1 BENIGN PROSTATIC HYPERPLASIA WITH LOWER URINARY TRACT SYMPTOMS, SYMPTOM DETAILS UNSPECIFIED: ICD-10-CM

## 2024-10-04 DIAGNOSIS — M15.0 PRIMARY OSTEOARTHRITIS INVOLVING MULTIPLE JOINTS: ICD-10-CM

## 2024-10-04 DIAGNOSIS — G47.33 OSA (OBSTRUCTIVE SLEEP APNEA): Chronic | ICD-10-CM

## 2024-10-04 DIAGNOSIS — E66.01 MORBID OBESITY (H): Chronic | ICD-10-CM

## 2024-10-04 DIAGNOSIS — Z82.71 FAMILY HISTORY OF POLYCYSTIC KIDNEY: ICD-10-CM

## 2024-10-04 DIAGNOSIS — Z86.0100 HISTORY OF COLONIC POLYPS: ICD-10-CM

## 2024-10-04 DIAGNOSIS — I10 BENIGN ESSENTIAL HYPERTENSION: ICD-10-CM

## 2024-10-04 DIAGNOSIS — R53.83 LOW ENERGY: ICD-10-CM

## 2024-10-04 DIAGNOSIS — Z00.00 ROUTINE GENERAL MEDICAL EXAMINATION AT A HEALTH CARE FACILITY: Primary | ICD-10-CM

## 2024-10-04 DIAGNOSIS — Z01.818 PREOP GENERAL PHYSICAL EXAM: ICD-10-CM

## 2024-10-04 DIAGNOSIS — E78.5 HYPERLIPIDEMIA LDL GOAL <130: ICD-10-CM

## 2024-10-04 DIAGNOSIS — Z96.653 HX OF TOTAL KNEE REPLACEMENT, BILATERAL: ICD-10-CM

## 2024-10-04 DIAGNOSIS — Z12.5 SCREENING FOR PROSTATE CANCER: ICD-10-CM

## 2024-10-04 LAB
ALBUMIN SERPL BCG-MCNC: 4.4 G/DL (ref 3.5–5.2)
ALP SERPL-CCNC: 97 U/L (ref 40–150)
ALT SERPL W P-5'-P-CCNC: 20 U/L (ref 0–70)
ANION GAP SERPL CALCULATED.3IONS-SCNC: 11 MMOL/L (ref 7–15)
AST SERPL W P-5'-P-CCNC: 20 U/L (ref 0–45)
BILIRUB SERPL-MCNC: 1.1 MG/DL
BUN SERPL-MCNC: 13.6 MG/DL (ref 6–20)
CALCIUM SERPL-MCNC: 8.9 MG/DL (ref 8.8–10.4)
CHLORIDE SERPL-SCNC: 105 MMOL/L (ref 98–107)
CHOLEST SERPL-MCNC: 189 MG/DL
CREAT SERPL-MCNC: 0.66 MG/DL (ref 0.67–1.17)
EGFRCR SERPLBLD CKD-EPI 2021: >90 ML/MIN/1.73M2
FASTING STATUS PATIENT QL REPORTED: YES
FASTING STATUS PATIENT QL REPORTED: YES
GLUCOSE SERPL-MCNC: 86 MG/DL (ref 70–99)
HCO3 SERPL-SCNC: 25 MMOL/L (ref 22–29)
HDLC SERPL-MCNC: 48 MG/DL
LDLC SERPL CALC-MCNC: 125 MG/DL
NONHDLC SERPL-MCNC: 141 MG/DL
POTASSIUM SERPL-SCNC: 4 MMOL/L (ref 3.4–5.3)
PROT SERPL-MCNC: 7.2 G/DL (ref 6.4–8.3)
PSA SERPL DL<=0.01 NG/ML-MCNC: 0.36 NG/ML (ref 0–3.5)
SODIUM SERPL-SCNC: 141 MMOL/L (ref 135–145)
TRIGL SERPL-MCNC: 78 MG/DL
TSH SERPL DL<=0.005 MIU/L-ACNC: 1.21 UIU/ML (ref 0.3–4.2)

## 2024-10-04 PROCEDURE — 99396 PREV VISIT EST AGE 40-64: CPT | Mod: 25 | Performed by: STUDENT IN AN ORGANIZED HEALTH CARE EDUCATION/TRAINING PROGRAM

## 2024-10-04 PROCEDURE — 99214 OFFICE O/P EST MOD 30 MIN: CPT | Mod: 25 | Performed by: STUDENT IN AN ORGANIZED HEALTH CARE EDUCATION/TRAINING PROGRAM

## 2024-10-04 PROCEDURE — 93000 ELECTROCARDIOGRAM COMPLETE: CPT | Performed by: STUDENT IN AN ORGANIZED HEALTH CARE EDUCATION/TRAINING PROGRAM

## 2024-10-04 PROCEDURE — 69209 REMOVE IMPACTED EAR WAX UNI: CPT | Mod: 59 | Performed by: STUDENT IN AN ORGANIZED HEALTH CARE EDUCATION/TRAINING PROGRAM

## 2024-10-04 PROCEDURE — 80053 COMPREHEN METABOLIC PANEL: CPT | Performed by: STUDENT IN AN ORGANIZED HEALTH CARE EDUCATION/TRAINING PROGRAM

## 2024-10-04 PROCEDURE — 84443 ASSAY THYROID STIM HORMONE: CPT | Performed by: STUDENT IN AN ORGANIZED HEALTH CARE EDUCATION/TRAINING PROGRAM

## 2024-10-04 PROCEDURE — G0103 PSA SCREENING: HCPCS | Performed by: STUDENT IN AN ORGANIZED HEALTH CARE EDUCATION/TRAINING PROGRAM

## 2024-10-04 PROCEDURE — 80061 LIPID PANEL: CPT | Performed by: STUDENT IN AN ORGANIZED HEALTH CARE EDUCATION/TRAINING PROGRAM

## 2024-10-04 PROCEDURE — 36415 COLL VENOUS BLD VENIPUNCTURE: CPT | Performed by: STUDENT IN AN ORGANIZED HEALTH CARE EDUCATION/TRAINING PROGRAM

## 2024-10-04 RX ORDER — AMLODIPINE BESYLATE 2.5 MG/1
2.5 TABLET ORAL DAILY
Qty: 90 TABLET | Refills: 4 | Status: SHIPPED | OUTPATIENT
Start: 2024-10-04

## 2024-10-04 SDOH — HEALTH STABILITY: PHYSICAL HEALTH: ON AVERAGE, HOW MANY DAYS PER WEEK DO YOU ENGAGE IN MODERATE TO STRENUOUS EXERCISE (LIKE A BRISK WALK)?: 0 DAYS

## 2024-10-04 ASSESSMENT — PAIN SCALES - GENERAL: PAINLEVEL: MILD PAIN (2)

## 2024-10-04 ASSESSMENT — SOCIAL DETERMINANTS OF HEALTH (SDOH): HOW OFTEN DO YOU GET TOGETHER WITH FRIENDS OR RELATIVES?: ONCE A WEEK

## 2024-10-04 NOTE — PROGRESS NOTES
Preoperative Evaluation  [address] [address]  Phone: [phone]  Fax: [phone]  Primary Provider: Physician No Ref-Primary  Pre-op Performing Provider: Mauro Singleton MD  Oct 4, 2024   {Provider  Link to PREOP SmartSet  REQUIRED to apply standard patient instructions and medication directions to the AVS :673057}  {ROOMER review and update patient entered surgical information if needed :667967}        10/4/2024   Surgical Information   What procedure is being done? revision total right knee   Facility or Hospital where procedure/surgery will be performed: [facility]   Who is doing the procedure / surgery? Dr Fernandez   Date of surgery / procedure: 10/23/24   Time of surgery / procedure: .   Where do you plan to recover after surgery? at home with family        Fax number for surgical facility: {:[phone]}    {Provider Charting Preference for Preop :142633}    Mac aHmm is a 59 year old, presenting for the following:  Physical and Pre-Op Exam          10/4/2024    10:25 AM   Additional Questions   Roomed by Omayra BELLA related to upcoming procedure: ***        10/4/2024   Pre-Op Questionnaire   Have you ever had a heart attack or stroke? No   Have you ever had surgery on your heart or blood vessels, such as a stent placement, a coronary artery bypass, or surgery on an artery in your head, neck, heart, or legs? No   Do you have chest pain with activity? No   Do you have a history of heart failure? No   Do you currently have a cold, bronchitis or symptoms of other infection? No   Do you have a cough, shortness of breath, or wheezing? No   Do you or anyone in your family have previous history of blood clots? No   Do you or does anyone in your family have a serious bleeding problem such as prolonged bleeding following surgeries or cuts? No   Have you ever had problems with anemia or been told to take iron pills? No   Have you had any  abnormal blood loss such as black, tarry or bloody stools? No   Have you ever had a blood transfusion? No   Are you willing to have a blood transfusion if it is medically needed before, during, or after your surgery? Yes   Have you or any of your relatives ever had problems with anesthesia? No   Do you have sleep apnea, excessive snoring or daytime drowsiness? (!) YES   Do you have a CPAP machine? Yes   Do you have any artifical heart valves or other implanted medical devices like a pacemaker, defibrillator, or continuous glucose monitor? No   Do you have artificial joints? (!) YES   Are you allergic to latex? No        Health Care Directive  Patient does not have a Health Care Directive or Living Will: {ADVANCE_DIRECTIVE_STATUS:600640}    Preoperative Review of   {Mnpmpreport:182932}  {Review MNPMP for all patients per [facility] MNPMP Profile:681556}    {Chronic problem details (Optional) :738963}    Patient Active Problem List    Diagnosis Date Noted    Benign prostatic hyperplasia with lower urinary tract symptoms, symptom details unspecified 08/22/2023     Priority: Medium    LUIS ALFREDO (obstructive sleep apnea) 11/06/2018     Priority: Medium     HST on 3/18/2015 (287#)-AHI 13, KOURTNEY 11, lowest oxygen saturation was 86%      Hx of recurrent herpes simplex ophthalmicus 11/06/2018     Priority: Medium    Hx of total knee replacement, bilateral 11/06/2018     Priority: Medium    Benign essential hypertension 11/06/2018     Priority: Medium    Dupuytren's contracture of right hand 11/06/2018     Priority: Medium    Primary osteoarthritis involving multiple joints 11/06/2018     Priority: Medium    Morbid obesity (H) 05/18/2018     Priority: Medium    Hyperhidrosis, focal, secondary 11/12/2017     Priority: Medium    Hyperlipidemia LDL goal <130 11/12/2017     Priority: Medium      Past Medical History:   Diagnosis Date    History of bilateral knee replacement 11/06/2018    History of repair of right rotator cuff 11/06/2018     "Hypertension, benign essential, goal below 140/90 11/12/2017     Past Surgical History:   Procedure Laterality Date    COLONOSCOPY N/A 2/8/2024    Procedure: Colonoscopy;  Surgeon: Hector Anaya DO;  Location:  GI    JOINT REPLACEMTN, KNEE RT/LT      ROTATOR CUFF REPAIR RT/LT Right      Current Outpatient Medications   Medication Sig Dispense Refill    ADVIL OR       [facility]E OR       amLODIPine (NORVASC) 2.5 MG tablet Take 1 tablet (2.5 mg) by mouth daily. 90 tablet 4    clindamycin (CLEOCIN) 300 MG capsule Take 600 mg orally 30-60 minutes prior to dental procedure 2 capsule 1    hyoscyamine ER (LEVBID) 375 mcg 12 hr tablet TAKE ONE TABLET BY MOUTH EVERY 12 HOURS AS NEEDED FOR CRAMPING 30 tablet 5    meloxicam (MOBIC) 15 MG tablet Take 1 tablet (15 mg) by mouth as needed for moderate pain 60 tablet 0    Semaglutide-Weight Management (WEGOVY) 2.4 MG/0.75ML pen Inject 2.4 mg subcutaneously once a week. 3 mL 11    valACYclovir (VALTREX) 1000 mg tablet Take 2 tablets by mouth at the first sign of symptoms then in 12 hours take another 2 tablets. 30 tablet 3       Allergies   Allergen Reactions    Pcn [Penicillins] Hives        Social History     Tobacco Use    Smoking status: Never    Smokeless tobacco: Never   Substance Use Topics    Alcohol use: Yes     Comment: occ     {FAMILY HISTORY (Optional):918731588}  History   Drug Use No           {ROS Picklists (Optional):591125}    Objective    /83   Pulse 75   Temp 97.5  F (36.4  C) (Temporal)   Resp 15   Ht 1.753 m (5' 9\")   Wt 132.7 kg (292 lb 8 oz)   SpO2 97%   BMI 43.19 kg/m     Estimated body mass index is 43.19 kg/m  as calculated from the following:    Height as of this encounter: 1.753 m (5' 9\").    Weight as of this encounter: 132.7 kg (292 lb 8 oz).  Physical Exam  {Exam List :477204}    No results for input(s): \"HGB\", \"PLT\", \"INR\", \"NA\", \"POTASSIUM\", \"CR\", \"A1C\" in the last 8760 hours.     Diagnostics  {LABS:509312} "   {EK}    Revised Cardiac Risk Index (RCRI)  The patient has the following serious cardiovascular risks for perioperative complications:  {PREOP REVISED CARDIAC RISK INDEX (RCRI) :064756}     RCRI Interpretation: {REVISED CARDIAC RISK INTERPRETATION :958673}         Signed Electronically by: Mauro Singleton MD  A copy of this evaluation report is provided to the requesting physician.    {Provider Resources  Preop [facility] Preop Guidelines  Revised Cardiac Risk Index :114398}   {Email feedback regarding this note to [email or web address]   :984280}

## 2024-10-04 NOTE — PROGRESS NOTES
Preoperative Evaluation  [address] [address]  Phone: [phone]  Fax: [phone]  Primary Provider: Physician No Ref-Primary  Pre-op Performing Provider: Mauro Singleton MD  Oct 4, 2024             10/4/2024   Surgical Information   What procedure is being done? revision total right knee   Facility or Hospital where procedure/surgery will be performed: [facility]   Who is doing the procedure / surgery? Dr Fernandez   Date of surgery / procedure: 10/23/24   Time of surgery / procedure: .   Where do you plan to recover after surgery? at home with family          Assessment & Plan     The proposed surgical procedure is considered INTERMEDIATE risk.    Problem List Items Addressed This Visit          Respiratory    LUIS ALFREDO (obstructive sleep apnea) (Chronic)       Digestive    Morbid obesity (H) (Chronic)    Relevant Medications    Semaglutide-Weight Management (WEGOVY) 2.4 MG/0.75ML pen       Endocrine    Hyperlipidemia LDL goal <130 (Chronic)    Relevant Orders    Lipid panel reflex to direct LDL Fasting       Circulatory    Benign essential hypertension (Chronic)    Relevant Medications    amLODIPine (NORVASC) 2.5 MG tablet    Other Relevant Orders    Comprehensive metabolic panel (BMP + Alb, Alk Phos, ALT, AST, Total. Bili, TP)       Musculoskeletal and Integumentary    Primary osteoarthritis involving multiple joints       Urinary    Benign prostatic hyperplasia with lower urinary tract symptoms, symptom details unspecified       Other    Hx of total knee replacement, bilateral     Other Visit Diagnoses       Routine general medical examination at a health care facility    -  Primary    Preop general physical exam        Relevant Orders    EKG 12-lead complete w/read - Clinics (Completed)    History of colonic polyps        Screening for prostate cancer        Relevant Orders    PSA, screen    Low energy        Relevant Orders    TSH with free T4  reflex    Family history of polycystic kidney                   Risks and Recommendations  The patient has the following additional risks and recommendations for perioperative complications:   - Morbid obesity (BMI >40)  Obstructive Sleep Apnea:   -CPAP    Antiplatelet or Anticoagulation Medication Instructions   - Patient is on no antiplatelet or anticoagulation medications.    Additional Medication Instructions   - GLP-1 Injectable (exenitide, liraglutide, semaglutide, dulaglutide, etc.): DO NOT TAKE 7 days before surgery   Hold NSAIDs 5 days prior  Hold supplements 7 days prior    Recommendation  Approval given to proceed with proposed procedure, without further diagnostic evaluation.    Mac Hamm is a 59 year old, presenting for the following:  Physical and Pre-Op Exam          10/4/2024    10:25 AM   Additional Questions   Roomed by Omayra BELLA related to upcoming procedure: Loose parts after previous right knee replacement and planning for revision.         10/4/2024   Pre-Op Questionnaire   Have you ever had a heart attack or stroke? No   Have you ever had surgery on your heart or blood vessels, such as a stent placement, a coronary artery bypass, or surgery on an artery in your head, neck, heart, or legs? No   Do you have chest pain with activity? No   Do you have a history of heart failure? No   Do you currently have a cold, bronchitis or symptoms of other infection? No   Do you have a cough, shortness of breath, or wheezing? No   Do you or anyone in your family have previous history of blood clots? No   Do you or does anyone in your family have a serious bleeding problem such as prolonged bleeding following surgeries or cuts? No   Have you ever had problems with anemia or been told to take iron pills? No   Have you had any abnormal blood loss such as black, tarry or bloody stools? No   Have you ever had a blood transfusion? No   Are you willing to have a blood transfusion if it is medically  needed before, during, or after your surgery? Yes   Have you or any of your relatives ever had problems with anesthesia? No   Do you have sleep apnea, excessive snoring or daytime drowsiness? (!) YES   Do you have a CPAP machine? Yes   Do you have any artifical heart valves or other implanted medical devices like a pacemaker, defibrillator, or continuous glucose monitor? No   Do you have artificial joints? (!) YES   Are you allergic to latex? No        Health Care Directive  Patient does not have a Health Care Directive or Living Will: Discussed advance care planning with patient; information given to patient to review.    Preoperative Review of    reviewed - no record of controlled substances prescribed.      Status of Chronic Conditions:  See problem list for active medical problems.  Problems all longstanding and stable, except as noted/documented.  See ROS for pertinent symptoms related to these conditions.    Patient Active Problem List    Diagnosis Date Noted    Benign prostatic hyperplasia with lower urinary tract symptoms, symptom details unspecified 08/22/2023     Priority: Medium    LUIS ALFREDO (obstructive sleep apnea) 11/06/2018     Priority: Medium     HST on 3/18/2015 (287#)-AHI 13, KOURTNEY 11, lowest oxygen saturation was 86%      Hx of recurrent herpes simplex ophthalmicus 11/06/2018     Priority: Medium    Hx of total knee replacement, bilateral 11/06/2018     Priority: Medium    Benign essential hypertension 11/06/2018     Priority: Medium    Dupuytren's contracture of right hand 11/06/2018     Priority: Medium    Primary osteoarthritis involving multiple joints 11/06/2018     Priority: Medium    Morbid obesity (H) 05/18/2018     Priority: Medium    Hyperhidrosis, focal, secondary 11/12/2017     Priority: Medium    Hyperlipidemia LDL goal <130 11/12/2017     Priority: Medium      Past Medical History:   Diagnosis Date    History of bilateral knee replacement 11/06/2018    History of repair of right  "rotator cuff 11/06/2018    Hypertension, benign essential, goal below 140/90 11/12/2017     Past Surgical History:   Procedure Laterality Date    COLONOSCOPY N/A 2/8/2024    Procedure: Colonoscopy;  Surgeon: Hector Anaya DO;  Location: [facility]    JOINT REPLACEMTN, KNEE RT/LT      ROTATOR CUFF REPAIR RT/LT Right      Current Outpatient Medications   Medication Sig Dispense Refill    ADVIL OR       [facility]E OR       amLODIPine (NORVASC) 2.5 MG tablet Take 1 tablet (2.5 mg) by mouth daily. 90 tablet 4    clindamycin (CLEOCIN) 300 MG capsule Take 600 mg orally 30-60 minutes prior to dental procedure 2 capsule 1    hyoscyamine ER (LEVBID) 375 mcg 12 hr tablet TAKE ONE TABLET BY MOUTH EVERY 12 HOURS AS NEEDED FOR CRAMPING 30 tablet 5    meloxicam (MOBIC) 15 MG tablet Take 1 tablet (15 mg) by mouth as needed for moderate pain 60 tablet 0    Semaglutide-Weight Management (WEGOVY) 2.4 MG/0.75ML pen Inject 2.4 mg subcutaneously once a week. 3 mL 11    valACYclovir (VALTREX) 1000 mg tablet Take 2 tablets by mouth at the first sign of symptoms then in 12 hours take another 2 tablets. 30 tablet 3       Allergies   Allergen Reactions    Pcn [Penicillins] Hives        Social History     Tobacco Use    Smoking status: Never    Smokeless tobacco: Never   Substance Use Topics    Alcohol use: Yes     Comment: occ     Family History   Problem Relation Age of Onset    Skin Cancer Mother     Polycystic Kidney Diease Mother     Diabetes Father     Thyroid Disease Father     Hypertension Father     Skin Cancer Father     Diabetes Brother      History   Drug Use No             Review of Systems  Constitutional, HEENT, cardiovascular, pulmonary, GI, , musculoskeletal, neuro, skin, endocrine and psych systems are negative, except as otherwise noted.    Objective    /83   Pulse 75   Temp 97.5  F (36.4  C) (Temporal)   Resp 15   Ht 1.753 m (5' 9\")   Wt 132.7 kg (292 lb 8 oz)   SpO2 97%   BMI 43.19 kg/m     Estimated body " "mass index is 43.19 kg/m  as calculated from the following:    Height as of this encounter: 1.753 m (5' 9\").    Weight as of this encounter: 132.7 kg (292 lb 8 oz).  Physical Exam  GENERAL: alert and no distress  EYES: Eyes grossly normal to inspection, PERRL and conjunctivae and sclerae normal  HENT: ear canals and TM's normal, nose and mouth without ulcers or lesions  NECK: no adenopathy, no asymmetry, masses, or scars  RESP: lungs clear to auscultation - no rales, rhonchi or wheezes  CV: regular rate and rhythm, normal S1 S2, no S3 or S4, no murmur, click or rub, no peripheral edema  ABDOMEN: soft, nontender, no hepatosplenomegaly, no masses and bowel sounds normal  MS: no gross musculoskeletal defects noted, no edema  SKIN: no suspicious lesions or rashes  NEURO: Normal strength and tone, mentation intact and speech normal  PSYCH: mentation appears normal, affect normal/bright    No results for input(s): \"HGB\", \"PLT\", \"INR\", \"NA\", \"POTASSIUM\", \"CR\", \"A1C\" in the last 8760 hours.     Diagnostics  Labs pending at this time.  Results will be reviewed when available.   EKG: appears normal, NSR, normal axis, normal intervals, no acute ST/T changes c/w ischemia, no LVH by voltage criteria    Revised Cardiac Risk Index (RCRI)  The patient has the following serious cardiovascular risks for perioperative complications:   - No serious cardiac risks = 0 points     RCRI Interpretation: 0 points: Class I (very low risk - 0.4% complication rate)         Signed Electronically by: Mauro Singleton MD  A copy of this evaluation report is provided to the requesting physician.        "

## 2024-10-04 NOTE — PROGRESS NOTES
Preventive Care Visit  [facility]  Mauro Sinlgeton MD, Family Medicine  Oct 4, 2024      Assessment & Plan   Problem List Items Addressed This Visit          Respiratory    LUIS ALFREDO (obstructive sleep apnea) (Chronic)       Digestive    Morbid obesity (H) (Chronic)    Relevant Medications    Semaglutide-Weight Management (WEGOVY) 2.4 MG/0.75ML pen       Endocrine    Hyperlipidemia LDL goal <130 (Chronic)    Relevant Orders    Lipid panel reflex to direct LDL Fasting       Circulatory    Benign essential hypertension (Chronic)    Relevant Medications    amLODIPine (NORVASC) 2.5 MG tablet    Other Relevant Orders    Comprehensive metabolic panel (BMP + Alb, Alk Phos, ALT, AST, Total. Bili, TP)       Musculoskeletal and Integumentary    Primary osteoarthritis involving multiple joints       Urinary    Benign prostatic hyperplasia with lower urinary tract symptoms, symptom details unspecified       Other    Hx of total knee replacement, bilateral     Other Visit Diagnoses       Routine general medical examination at a health care facility    -  Primary    Preop general physical exam        Relevant Orders    EKG 12-lead complete w/read - Clinics (Completed)    History of colonic polyps        Screening for prostate cancer        Relevant Orders    PSA, screen    Low energy        Relevant Orders    TSH with free T4 reflex    Family history of polycystic kidney               Age-appropriate screening and immunization reviewed today.  Repeat labs as well as PSA screening.  Will repeat kidney function but has been normal but family history of polycystic kidney disease.  Did discuss implications of this today.  EKG done for his preop that was added on today and no further workup needed at this time.  Has done well with amlodipine and will continue now.  Wegovy has been very helpful and down 60 pounds.  Will continue and he is tolerating well without side effects.  Importance of lifestyle  "changes discussed.  Repeat TSH given energy but continue to encourage CPAP use and healthy well-rounded diet to help with weight loss.  Cardiovascular risk reviewed and consider statin pending lipid evaluation.    Patient has been advised of split billing requirements and indicates understanding: Yes       BMI  Estimated body mass index is 43.19 kg/m  as calculated from the following:    Height as of this encounter: 1.753 m (5' 9\").    Weight as of this encounter: 132.7 kg (292 lb 8 oz).   Weight management plan: Discussed healthy diet and exercise guidelines    Counseling  Appropriate preventive services were addressed with this patient via screening, questionnaire, or discussion as appropriate for fall prevention, nutrition, physical activity, Tobacco-use cessation, social engagement, weight loss and cognition.  Checklist reviewing preventive services available has been given to the patient.  Reviewed patient's diet, addressing concerns and/or questions.         Mac Hamm is a 59 year old, presenting for the following:  Physical and Pre-Op Exam        10/4/2024    10:25 AM   Additional Questions   Roomed by Omayra BASHIR        Health Care Directive  Patient does not have a Health Care Directive or Living Will: Discussed advance care planning with patient; information given to patient to review.    Healthy Habits:     Getting at least 3 servings of Calcium per day:  NO    Bi-annual eye exam:  Yes    Dental care twice a year:  Yes    Sleep apnea or symptoms of sleep apnea:  Sleep apnea    Diet:  Regular (no restrictions)    Frequency of exercise:  1 day/week    Duration of exercise:  Less than 15 minutes    Taking medications regularly:  No    Barriers to taking medications:  None    Medication side effects:  None    Additional concerns today:  Yes          10/4/2024   General Health   How would you rate your overall physical health? Good   Feel stress (tense, anxious, or unable to sleep) To some extent      (!) " STRESS CONCERN      10/4/2024   Nutrition   Three or more servings of calcium each day? (!) NO   Diet: Other   If other, please elaborate: reduced calorie   How many servings of fruit and vegetables per day? (!) 2-3   How many sweetened beverages each day? 0-1            10/4/2024   Exercise   Days per week of moderate/strenous exercise 0 days      (!) EXERCISE CONCERN      10/4/2024   Social Factors   Frequency of gathering with friends or relatives Once a week   Worry food won't last until get money to buy more No   Food not last or not have enough money for food? No   Do you have housing? (Housing is defined as stable permanent housing and does not include staying ouside in a car, in a tent, in an abandoned building, in an overnight shelter, or couch-surfing.) Yes   Are you worried about losing your housing? No   Lack of transportation? No   Unable to get utilities (heat,electricity)? No            10/4/2024   Fall Risk   Fallen 2 or more times in the past year? No   Trouble with walking or balance? No             10/4/2024   Dental   Dentist two times every year? Yes            10/4/2024   TB Screening   Were you born outside of the [place]? No              Today's PHQ-2 Score:       4/3/2024     6:44 AM   PHQ-2 ( 1999 [employer])   Q1: Little interest or pleasure in doing things 0   Q2: Feeling down, depressed or hopeless 0   PHQ-2 Score 0   Q1: Little interest or pleasure in doing things Not at all   Q2: Feeling down, depressed or hopeless Not at all   PHQ-2 Score 0         10/4/2024   Substance Use   Alcohol more than 3/day or more than 7/wk No   Do you use any other substances recreationally? (!) CANNABIS PRODUCTS        Social History     Tobacco Use    Smoking status: Never    Smokeless tobacco: Never   Vaping Use    Vaping status: Never Used   Substance Use Topics    Alcohol use: Yes     Comment: occ    Drug use: No           10/4/2024   STI Screening   New sexual partner(s) since last STI/HIV test? No      Last  PSA:   PSA   Date Value Ref Range Status   2017 0.63 0 - 4 ug/L Final     Comment:     Assay Method:  Chemiluminescence using Siemens Vista analyzer     Prostate Specific Antigen Screen   Date Value Ref Range Status   2023 0.28 0.00 - 3.50 ng/mL Final     ASCVD Risk   The 10-year ASCVD risk score (Laz TIPTON, et al., 2019) is: 9.2%    Values used to calculate the score:      Age: 59 years      Sex: Male      Is Non- : No      Diabetic: No      Tobacco smoker: No      Systolic Blood Pressure: 129 mmHg      Is BP treated: Yes      HDL Cholesterol: 41 mg/dL      Total Cholesterol: 198 mg/dL    Fracture Risk Assessment Tool  Link to Frax Calculator  Use the information below to complete the Frax calculator  : 1965  Sex: male  Weight (kg): 132.7 kg (actual weight)  Height (cm): 175.3 cm  Previous Fragility Fracture:  No  History of parent with fractured hip:  No  Current Smoking:  No  Patient has been on glucocorticoids for more than 3 months (5mg/day or more): No  Rheumatoid Arthritis on Problem List:  No  Secondary Osteoporosis on Problem List:  No  Consumes 3 or more units of alcohol per day: No  Femoral Neck BMD (g/cm2)           Reviewed and updated as needed this visit by Provider                    Past Medical History:   Diagnosis Date    History of bilateral knee replacement 2018    History of repair of right rotator cuff 2018    Hypertension, benign essential, goal below 140/90 2017     Past Surgical History:   Procedure Laterality Date    COLONOSCOPY N/A 2024    Procedure: Colonoscopy;  Surgeon: Hector Anaya DO;  Location: [facility]    JOINT REPLACEMTN, KNEE RT/LT      ROTATOR CUFF REPAIR RT/LT Right      OB History   No obstetric history on file.         Review of Systems  Constitutional, HEENT, cardiovascular, pulmonary, GI, , musculoskeletal, neuro, skin, endocrine and psych systems are negative, except as otherwise noted.    "  Objective    Exam  /83   Pulse 75   Temp 97.5  F (36.4  C) (Temporal)   Resp 15   Ht 1.753 m (5' 9\")   Wt 132.7 kg (292 lb 8 oz)   SpO2 97%   BMI 43.19 kg/m     Estimated body mass index is 43.19 kg/m  as calculated from the following:    Height as of this encounter: 1.753 m (5' 9\").    Weight as of this encounter: 132.7 kg (292 lb 8 oz).    Physical Exam  GENERAL: alert and no distress  EYES: Eyes grossly normal to inspection, PERRL and conjunctivae and sclerae normal  HENT: ear canals and TM's normal, nose and mouth without ulcers or lesions  NECK: no adenopathy, no asymmetry, masses, or scars  RESP: lungs clear to auscultation - no rales, rhonchi or wheezes  CV: regular rate and rhythm, normal S1 S2, no S3 or S4, no murmur, click or rub, no peripheral edema  ABDOMEN: soft, nontender, no hepatosplenomegaly, no masses and bowel sounds normal  MS: no gross musculoskeletal defects noted, no edema  SKIN: no suspicious lesions or rashes  NEURO: Normal strength and tone, mentation intact and speech normal  PSYCH: mentation appears normal, affect normal/bright        Signed Electronically by: Mauro Singleton MD    "

## 2024-10-04 NOTE — PATIENT INSTRUCTIONS
Patient Education   Preventive Care Advice   This is general advice given by our system to help you stay healthy. However, your care team may have specific advice just for you. Please talk to your care team about your preventive care needs.  Nutrition  Eat 5 or more servings of fruits and vegetables each day.  Try wheat bread, brown rice and whole grain pasta (instead of white bread, rice, and pasta).  Get enough calcium and vitamin D. Check the label on foods and aim for 100% of the RDA (recommended daily allowance).  Lifestyle  Exercise at least 150 minutes each week  (30 minutes a day, 5 days a week).  Do muscle strengthening activities 2 days a week. These help control your weight and prevent disease.  No smoking.  Wear sunscreen to prevent skin cancer.  Have a dental exam and cleaning every 6 months.  Yearly exams  See your health care team every year to talk about:  Any changes in your health.  Any medicines your care team has prescribed.  Preventive care, family planning, and ways to prevent chronic diseases.  Shots (vaccines)   HPV shots (up to age 26), if you've never had them before.  Hepatitis B shots (up to age 59), if you've never had them before.  COVID-19 shot: Get this shot when it's due.  Flu shot: Get a flu shot every year.  Tetanus shot: Get a tetanus shot every 10 years.  Pneumococcal, hepatitis A, and RSV shots: Ask your care team if you need these based on your risk.  Shingles shot (for age 50 and up)  General health tests  Diabetes screening:  Starting at age 35, Get screened for diabetes at least every 3 years.  If you are younger than age 35, ask your care team if you should be screened for diabetes.  Cholesterol test: At age 39, start having a cholesterol test every 5 years, or more often if advised.  Bone density scan (DEXA): At age 50, ask your care team if you should have this scan for osteoporosis (brittle bones).  Hepatitis C: Get tested at least once in your life.  STIs (sexually  transmitted infections)  Before age 24: Ask your care team if you should be screened for STIs.  After age 24: Get screened for STIs if you're at risk. You are at risk for STIs (including HIV) if:  You are sexually active with more than one person.  You don't use condoms every time.  You or a partner was diagnosed with a sexually transmitted infection.  If you are at risk for HIV, ask about PrEP medicine to prevent HIV.  Get tested for HIV at least once in your life, whether you are at risk for HIV or not.  Cancer screening tests  Cervical cancer screening: If you have a cervix, begin getting regular cervical cancer screening tests starting at age 21.  Breast cancer scan (mammogram): If you've ever had breasts, begin having regular mammograms starting at age 40. This is a scan to check for breast cancer.  Colon cancer screening: It is important to start screening for colon cancer at age 45.  Have a colonoscopy test every 10 years (or more often if you're at risk) Or, ask your provider about stool tests like a FIT test every year or Cologuard test every 3 years.  To learn more about your testing options, visit:   .  For help making a decision, visit:   https://bit.ly/qe45278.  Prostate cancer screening test: If you have a prostate, ask your care team if a prostate cancer screening test (PSA) at age 55 is right for you.  Lung cancer screening: If you are a current or former smoker ages 50 to 80, ask your care team if ongoing lung cancer screenings are right for you.  For informational purposes only. Not to replace the advice of your health care provider. Copyright   2023 [facility]. All rights reserved. Clinically reviewed by the [facility] Transitions Program. EQAL 715697 - REV 01/24.  Substance Use Disorder: Care Instructions  Overview     You can improve your life and health by stopping your use of alcohol or drugs. When you don't drink or use drugs, you may feel and sleep better. You may  get along better with your family, friends, and coworkers. There are medicines and programs that can help with substance use disorder.  How can you care for yourself at home?  Here are some ways to help you stay sober and prevent relapse.  If you have been given medicine to help keep you sober or reduce your cravings, be sure to take it exactly as prescribed.  Talk to your doctor about programs that can help you stop using drugs or drinking alcohol.  Do not keep alcohol or drugs in your home.  Plan ahead. Think about what you'll say if other people ask you to drink or use drugs. Try not to spend time with people who drink or use drugs.  Use the time and money spent on drinking or drugs to do something that's important to you.  Preventing a relapse  Have a plan to deal with relapse. Learn to recognize changes in your thinking that lead you to drink or use drugs. Get help before you start to drink or use drugs again.  Try to stay away from situations, friends, or places that may lead you to drink or use drugs.  If you feel the need to drink alcohol or use drugs again, seek help right away. Call a trusted friend or family member. Some people get support from organizations such as Narcotics Anonymous or igadget.asia or from treatment facilities.  If you relapse, get help as soon as you can. Some people make a plan with another person that outlines what they want that person to do for them if they relapse. The plan usually includes how to handle the relapse and who to notify in case of relapse.  Don't give up. Remember that a relapse doesn't mean that you have failed. Use the experience to learn the triggers that lead you to drink or use drugs. Then quit again. Recovery is a lifelong process. Many people have several relapses before they are able to quit for good.  Follow-up care is a key part of your treatment and safety. Be sure to make and go to all appointments, and call your doctor if you are having problems. It's  "also a good idea to know your test results and keep a list of the medicines you take.  When should you call for help?   Call 911  anytime you think you may need emergency care. For example, call if you or someone else:    Has overdosed or has withdrawal signs. Be sure to tell the emergency workers that you are or someone else is using or trying to quit using drugs. Overdose or withdrawal signs may include:  Losing consciousness.  Seizure.  Seeing or hearing things that aren't there (hallucinations).     Is thinking or talking about suicide or harming others.   Where to get help 24 hours a day, 7 days a week   If you or someone you know talks about suicide, self-harm, a mental health crisis, a substance use crisis, or any other kind of emotional distress, get help right away. You can:    Call the Suicide and Crisis Lifeline at 988.     Call 5-251-722-TALK (1-278.202.4542).     Text HOME to 349820 to access the Crisis Text Line.   Consider saving these numbers in your phone.  Go to [employer]ide for more information or to chat online.  Call your doctor now or seek immediate medical care if:    You are having withdrawal symptoms. These may include nausea or vomiting, sweating, shakiness, and anxiety.   Watch closely for changes in your health, and be sure to contact your doctor if:    You have a relapse.     You need more help or support to stop.   Where can you learn more?  Go to https://www.Gigzolo.net/patiented  Enter H573 in the search box to learn more about \"Substance Use Disorder: Care Instructions.\"  Current as of: November 15, 2023  Content Version: 14.2 2024 [facility].   Care instructions adapted under license by your healthcare professional. If you have questions about a medical condition or this instruction, always ask your healthcare professional. [employer] disclaims any warranty or liability for your use of this information.       "

## 2024-10-17 ENCOUNTER — TELEPHONE (OUTPATIENT)
Dept: FAMILY MEDICINE | Facility: CLINIC | Age: 59
End: 2024-10-17
Payer: COMMERCIAL

## 2024-10-17 ENCOUNTER — MYC REFILL (OUTPATIENT)
Dept: FAMILY MEDICINE | Facility: CLINIC | Age: 59
End: 2024-10-17
Payer: COMMERCIAL

## 2024-10-17 DIAGNOSIS — E66.01 MORBID OBESITY (H): Chronic | ICD-10-CM

## 2024-10-17 NOTE — TELEPHONE ENCOUNTER
Prior Authorization Retail Medication Request    Medication/Dose: Semaglutide-Weight Management (WEGOVY) 2.4 MG/0.75ML pen  Diagnosis and ICD code (if different than what is on RX):     Morbid obesity (H) [E66.01]         New/renewal/insurance change PA/secondary ins. PA:  Previously Tried and Failed:  na  Rationale:  na    Insurance   Primary: [facility]  Insurance ID:  [id number]     Secondary (if applicable):na  Insurance ID:  cassandra    Pharmacy Information (if different than what is on RX)  Name:  [facility]  Phone:  [phone]  Fax:[phone]    Clinic Information  Preferred routing pool for dept communication: [facility]

## 2024-10-21 NOTE — TELEPHONE ENCOUNTER
Prior Authorization Approval    Medication: WEGOVY 2.4 MG/0.75ML SC SOAJ  Authorization Effective Date: 10/21/2024  Authorization Expiration Date: 4/19/2025  Insurance Company: [employer] - Phone [phone] Fax [phone]  Which Pharmacy is filling the prescription: [facility] [address] [place] [address]  Pharmacy Notified: YES  Patient Notified: Instructed pharmacy to notify patient once order is ready.

## 2024-10-21 NOTE — TELEPHONE ENCOUNTER
PA Initiation    Medication: WEGOVY 2.4 MG/0.75ML SC SOAJ  Insurance Company: [employer] - Phone [phone] Fax [phone]  Pharmacy Filling the Rx: [facility] [address] [place] [address]  Filling Pharmacy Phone: [phone]  Filling Pharmacy Fax:    Start Date: 10/21/2024

## 2024-11-08 ENCOUNTER — TRANSFERRED RECORDS (OUTPATIENT)
Dept: FAMILY MEDICINE | Facility: CLINIC | Age: 59
End: 2024-11-08
Payer: COMMERCIAL

## 2024-12-06 ENCOUNTER — TRANSFERRED RECORDS (OUTPATIENT)
Dept: HEALTH INFORMATION MANAGEMENT | Facility: CLINIC | Age: 59
End: 2024-12-06

## 2025-01-10 ENCOUNTER — TRANSFERRED RECORDS (OUTPATIENT)
Dept: HEALTH INFORMATION MANAGEMENT | Facility: CLINIC | Age: 60
End: 2025-01-10
Payer: COMMERCIAL

## 2025-05-22 ENCOUNTER — TELEPHONE (OUTPATIENT)
Dept: FAMILY MEDICINE | Facility: CLINIC | Age: 60
End: 2025-05-22
Payer: COMMERCIAL

## 2025-05-22 NOTE — TELEPHONE ENCOUNTER
Prior Authorization Retail Medication Request    Medication/Dose: Semaglutide-Weight Management (WEGOVY) 2.4 MG/0.75ML pen  Diagnosis and ICD code (if different than what is on RX:    Morbid obesity (H) [E66.01]       New/renewal/insurance change PA/secondary ins. PA:  Previously Tried and Failed:  na  Rationale:  na    Insurance   Primary: [facility]  Insurance ID:   [id number]     Secondary (if applicable):na  Insurance ID:  cassandra    Pharmacy Information (if different than what is on RX)  Name:  [facility]  Phone:  [phone]  Fax:[phone]    Clinic Information  Preferred routing pool for dept communication: [facility]

## 2025-05-24 ENCOUNTER — TELEPHONE (OUTPATIENT)
Dept: FAMILY MEDICINE | Facility: CLINIC | Age: 60
End: 2025-05-24
Payer: COMMERCIAL

## 2025-05-24 NOTE — TELEPHONE ENCOUNTER
"  General Call    Contacts       Contact Date/Time Type Contact Phone/Fax    05/24/2025 11:48 AM CDT Phone (Incoming) Eveline (Other) [phone]     prompt after calling follow \"pharmacy requests\"   [facility]          Reason for Call:  Prior auth information - [facility] requesting update with case number sent to Mani on pt's behalf    What are your questions or concerns:    Case number: [id number]  Turn around time: marked urgent, to be completed within 24 hrs    Date of last appointment with provider: pt has upcoming appt on 06/18/25    Could we send this information to you in [place] or would you prefer to receive a phone call?:   Patient would prefer a phone call   Okay to leave a detailed message?: N/A at Other phone number:  [phone]    "

## 2025-05-27 NOTE — TELEPHONE ENCOUNTER
PA Initiation    Medication: WEGOVY 2.4 MG/0.75ML SC SOAJ  Insurance Company: [facility] Phone: [phone],  Fax: [phone]  Pharmacy Filling the Rx: [facility] [address] [place] [address]  Filling Pharmacy Phone: [phone]  Filling Pharmacy Fax:    Start Date: 5/27/2025  Retail Pharmacy Prior Authorization Team   Phone: [phone]

## 2025-06-10 ENCOUNTER — MYC REFILL (OUTPATIENT)
Dept: FAMILY MEDICINE | Facility: CLINIC | Age: 60
End: 2025-06-10
Payer: COMMERCIAL

## 2025-06-10 DIAGNOSIS — E66.01 MORBID OBESITY (H): Chronic | ICD-10-CM

## 2025-06-18 ENCOUNTER — OFFICE VISIT (OUTPATIENT)
Dept: FAMILY MEDICINE | Facility: CLINIC | Age: 60
End: 2025-06-18
Payer: COMMERCIAL

## 2025-06-18 VITALS
OXYGEN SATURATION: 98 % | HEART RATE: 72 BPM | TEMPERATURE: 97.5 F | HEIGHT: 69 IN | WEIGHT: 292.3 LBS | BODY MASS INDEX: 43.29 KG/M2 | SYSTOLIC BLOOD PRESSURE: 124 MMHG | RESPIRATION RATE: 16 BRPM | DIASTOLIC BLOOD PRESSURE: 72 MMHG

## 2025-06-18 DIAGNOSIS — E78.5 HYPERLIPIDEMIA LDL GOAL <130: Chronic | ICD-10-CM

## 2025-06-18 DIAGNOSIS — I10 BENIGN ESSENTIAL HYPERTENSION: Chronic | ICD-10-CM

## 2025-06-18 DIAGNOSIS — G47.33 OSA (OBSTRUCTIVE SLEEP APNEA): Chronic | ICD-10-CM

## 2025-06-18 DIAGNOSIS — N40.1 BENIGN PROSTATIC HYPERPLASIA WITH LOWER URINARY TRACT SYMPTOMS, SYMPTOM DETAILS UNSPECIFIED: ICD-10-CM

## 2025-06-18 DIAGNOSIS — E66.01 MORBID OBESITY (H): Primary | Chronic | ICD-10-CM

## 2025-06-18 PROCEDURE — 99213 OFFICE O/P EST LOW 20 MIN: CPT | Performed by: STUDENT IN AN ORGANIZED HEALTH CARE EDUCATION/TRAINING PROGRAM

## 2025-06-18 PROCEDURE — 3074F SYST BP LT 130 MM HG: CPT | Performed by: STUDENT IN AN ORGANIZED HEALTH CARE EDUCATION/TRAINING PROGRAM

## 2025-06-18 PROCEDURE — 3078F DIAST BP <80 MM HG: CPT | Performed by: STUDENT IN AN ORGANIZED HEALTH CARE EDUCATION/TRAINING PROGRAM

## 2025-06-18 PROCEDURE — 1126F AMNT PAIN NOTED NONE PRSNT: CPT | Performed by: STUDENT IN AN ORGANIZED HEALTH CARE EDUCATION/TRAINING PROGRAM

## 2025-06-18 PROCEDURE — G2211 COMPLEX E/M VISIT ADD ON: HCPCS | Performed by: STUDENT IN AN ORGANIZED HEALTH CARE EDUCATION/TRAINING PROGRAM

## 2025-06-18 RX ORDER — ROSUVASTATIN CALCIUM 10 MG/1
10 TABLET, COATED ORAL DAILY
COMMUNITY
Start: 2025-04-09

## 2025-06-18 RX ORDER — POLYMYXIN B SULFATE AND TRIMETHOPRIM 1; 10000 MG/ML; [USP'U]/ML
SOLUTION OPHTHALMIC
COMMUNITY
Start: 2025-01-03

## 2025-06-18 ASSESSMENT — PAIN SCALES - GENERAL: PAINLEVEL_OUTOF10: NO PAIN (0)

## 2025-06-18 NOTE — PROGRESS NOTES
"  Assessment & Plan   Problem List Items Addressed This Visit          Cardiovascular/Peripheral Vascular    Benign essential hypertension (Chronic)       Respiratory/Allergy    LUIS ALFREDO (obstructive sleep apnea) (Chronic)       Endocrine    Hyperlipidemia LDL goal <130 (Chronic)    Morbid obesity (H) - Primary (Chronic)       Genitourinary/Renal    Benign prostatic hyperplasia with lower urinary tract symptoms, symptom details unspecified      Patient has overall done well with medication and down 15% of his body weight.  He has maintain this but loss has been slower now.  he plans to add regular cardiovascular exercise and improve his diet as well as consider intermittent fasting going forward for further weight loss which is needed for his chronic comorbidities including sleep apnea hypertension hyperlipidemia and his heart disease.  Otherwise feeling well.  Plan to continue with Wegovy now and patient would benefit from continued use.    The longitudinal plan of care for the diagnosis(es)/condition(s) as documented were addressed during this visit. Due to the added complexity in care, I will continue to support Noris in the subsequent management and with ongoing continuity of care.       BMI  Estimated body mass index is 43.17 kg/m  as calculated from the following:    Height as of this encounter: 1.753 m (5' 9\").    Weight as of this encounter: 132.6 kg (292 lb 4.8 oz).   Weight management plan: Discussed healthy diet and exercise guidelines      Subjective   Noris is a 60 year old, presenting for the following health issues:  Recheck Medication (Wegovy)        6/18/2025     6:58 AM   Additional Questions   Roomed by Sandhya WOLFE         6/18/2025     6:58 AM   Patient Reported Additional Medications   Patient reports taking the following new medications Allergy medication, crestor     History of Present Illness       Reason for visit:  Refill details of wegovy    He eats 0-1 servings of fruits and vegetables daily.He " "consumes 0 sweetened beverage(s) daily.He exercises with enough effort to increase his heart rate 9 or less minutes per day.  He exercises with enough effort to increase his heart rate 3 or less days per week.   He is taking medications regularly.        Patient down 51 lbs from 22 months ago. Was lower but notes coming back from vacation this week. Has tolerated the medication pretty well. Mild stomach upset at times. He did retired in January and trying to get a better routine. Watching his diet and doing better from a portion stand point.       Review of Systems  Constitutional, HEENT, cardiovascular, pulmonary, GI, , musculoskeletal, neuro, skin, endocrine and psych systems are negative, except as otherwise noted.      Objective    /72   Pulse 72   Temp 97.5  F (36.4  C) (Temporal)   Resp 16   Ht 1.753 m (5' 9\")   Wt 132.6 kg (292 lb 4.8 oz)   SpO2 98%   BMI 43.17 kg/m    Body mass index is 43.17 kg/m .  Physical Exam   GENERAL: alert and no distress  EYES: Eyes grossly normal to inspection, PERRL and conjunctivae and sclerae normal  HENT:  nose and mouth without ulcers or lesions  NECK: no adenopathy, no asymmetry, masses, or scars  RESP: lungs clear to auscultation - no rales, rhonchi or wheezes  CV: regular rate and rhythm, normal S1 S2, no S3 or S4, no murmur, click or rub, no peripheral edema  ABDOMEN: soft, nontender, no hepatosplenomegaly, no masses and bowel sounds normal  MS: no gross musculoskeletal defects noted, no edema  SKIN: no suspicious lesions or rashes  NEURO: mentation intact and speech normal  PSYCH: mentation appears normal, affect normal/bright          Signed Electronically by: Mauro Singleton MD    "

## 2025-06-23 ENCOUNTER — TELEPHONE (OUTPATIENT)
Dept: FAMILY MEDICINE | Facility: CLINIC | Age: 60
End: 2025-06-23
Payer: COMMERCIAL

## 2025-06-23 NOTE — TELEPHONE ENCOUNTER
Patient needing information called to/sent to [employer] for Prior Authorization approval of his Wegovy 2.4mg/0.75ml pen. # [phone], option #2.  He came in for appointment with PCP on 6/18.  States provider documented the needed information in the visit note.  [facility] this information/visit note was to have been sent to [facility] so that his Wegovy would be approved; has been without med x3 weeks now.  He called [facility] this morning and they have not received anything for Dr. Singleton's office yet.  Routing to team for assistance.  Please assist.  Thank you.  Shawanda RICHARDS RN

## 2025-06-23 NOTE — TELEPHONE ENCOUNTER
Faxed additional information including chart notes from most recent clinic visit.  Faxed to [phone]  Asked for urgent review.

## 2025-06-24 NOTE — TELEPHONE ENCOUNTER
Called patient; left message that his prior authorization for his needed medication was approved through 12/21/25.  He should contact his pharmacy regarding picking it up.  Shawanda RICHARDS RN

## 2025-06-24 NOTE — TELEPHONE ENCOUNTER
Katerine Villar35 minutes ago (9:20 AM)     SW  This PA has now been approved. Effective dates are 06/24/2025-12/21/2025.   I have called the pharmacy and they now have a paid claim.  They will notify the patient when it is ready to .  Thank You  [facility] Prior Authorization Team   Phone: [phone]

## (undated) DEVICE — LUBRICATING JELLY 4.25OZ

## (undated) DEVICE — KIT ENDO TURNOVER/PROCEDURE CARRY-ON 101822

## (undated) DEVICE — GLOVE EXAM NITRILE LG

## (undated) DEVICE — SOL WATER IRRIG 1000ML BOTTLE 07139-09

## (undated) DEVICE — TUBING SUCTION 12"X1/4" N612

## (undated) RX ORDER — PROPOFOL 10 MG/ML
INJECTION, EMULSION INTRAVENOUS
Status: DISPENSED
Start: 2024-02-08

## (undated) RX ORDER — LIDOCAINE HYDROCHLORIDE 10 MG/ML
INJECTION, SOLUTION EPIDURAL; INFILTRATION; INTRACAUDAL; PERINEURAL
Status: DISPENSED
Start: 2024-02-08

## (undated) RX ORDER — HYDRALAZINE HYDROCHLORIDE 20 MG/ML
INJECTION INTRAMUSCULAR; INTRAVENOUS
Status: DISPENSED
Start: 2024-02-08